# Patient Record
Sex: MALE | Race: BLACK OR AFRICAN AMERICAN | NOT HISPANIC OR LATINO | URBAN - METROPOLITAN AREA
[De-identification: names, ages, dates, MRNs, and addresses within clinical notes are randomized per-mention and may not be internally consistent; named-entity substitution may affect disease eponyms.]

---

## 2023-02-18 ENCOUNTER — INPATIENT (INPATIENT)
Facility: HOSPITAL | Age: 33
LOS: 1 days | Discharge: LEFT BEFORE DISCHARGE | DRG: 603 | End: 2023-02-20
Attending: STUDENT IN AN ORGANIZED HEALTH CARE EDUCATION/TRAINING PROGRAM | Admitting: STUDENT IN AN ORGANIZED HEALTH CARE EDUCATION/TRAINING PROGRAM
Payer: MEDICAID

## 2023-02-18 VITALS
HEIGHT: 70 IN | WEIGHT: 169.98 LBS | OXYGEN SATURATION: 98 % | SYSTOLIC BLOOD PRESSURE: 139 MMHG | HEART RATE: 110 BPM | DIASTOLIC BLOOD PRESSURE: 93 MMHG | RESPIRATION RATE: 16 BRPM | TEMPERATURE: 98 F

## 2023-02-18 DIAGNOSIS — L03.90 CELLULITIS, UNSPECIFIED: ICD-10-CM

## 2023-02-18 LAB
ACETONE SERPL-MCNC: NEGATIVE — SIGNIFICANT CHANGE UP
ALBUMIN SERPL ELPH-MCNC: 3.1 G/DL — LOW (ref 3.5–5)
ALP SERPL-CCNC: 164 U/L — HIGH (ref 40–120)
ALT FLD-CCNC: 26 U/L DA — SIGNIFICANT CHANGE UP (ref 10–60)
ANION GAP SERPL CALC-SCNC: 5 MMOL/L — SIGNIFICANT CHANGE UP (ref 5–17)
AST SERPL-CCNC: 20 U/L — SIGNIFICANT CHANGE UP (ref 10–40)
BASE EXCESS BLDV CALC-SCNC: 6.3 MMOL/L — SIGNIFICANT CHANGE UP
BASOPHILS # BLD AUTO: 0.07 K/UL — SIGNIFICANT CHANGE UP (ref 0–0.2)
BASOPHILS NFR BLD AUTO: 0.6 % — SIGNIFICANT CHANGE UP (ref 0–2)
BILIRUB SERPL-MCNC: 0.2 MG/DL — SIGNIFICANT CHANGE UP (ref 0.2–1.2)
BUN SERPL-MCNC: 18 MG/DL — SIGNIFICANT CHANGE UP (ref 7–18)
CALCIUM SERPL-MCNC: 9.5 MG/DL — SIGNIFICANT CHANGE UP (ref 8.4–10.5)
CHLORIDE SERPL-SCNC: 95 MMOL/L — LOW (ref 96–108)
CO2 SERPL-SCNC: 28 MMOL/L — SIGNIFICANT CHANGE UP (ref 22–31)
CREAT SERPL-MCNC: 1.09 MG/DL — SIGNIFICANT CHANGE UP (ref 0.5–1.3)
EGFR: 94 ML/MIN/1.73M2 — SIGNIFICANT CHANGE UP
EOSINOPHIL # BLD AUTO: 0.43 K/UL — SIGNIFICANT CHANGE UP (ref 0–0.5)
EOSINOPHIL NFR BLD AUTO: 3.5 % — SIGNIFICANT CHANGE UP (ref 0–6)
FLUAV AG NPH QL: SIGNIFICANT CHANGE UP
FLUBV AG NPH QL: SIGNIFICANT CHANGE UP
GLUCOSE SERPL-MCNC: 667 MG/DL — CRITICAL HIGH (ref 70–99)
HCO3 BLDV-SCNC: 32 MMOL/L — HIGH (ref 22–29)
HCT VFR BLD CALC: 44 % — SIGNIFICANT CHANGE UP (ref 39–50)
HGB BLD-MCNC: 14.6 G/DL — SIGNIFICANT CHANGE UP (ref 13–17)
HIV 1 & 2 AB SERPL IA.RAPID: SIGNIFICANT CHANGE UP
HOROWITZ INDEX BLDV+IHG-RTO: 21 — SIGNIFICANT CHANGE UP
IMM GRANULOCYTES NFR BLD AUTO: 0.6 % — SIGNIFICANT CHANGE UP (ref 0–0.9)
LACTATE SERPL-SCNC: 1.1 MMOL/L — SIGNIFICANT CHANGE UP (ref 0.7–2)
LYMPHOCYTES # BLD AUTO: 25.6 % — SIGNIFICANT CHANGE UP (ref 13–44)
LYMPHOCYTES # BLD AUTO: 3.18 K/UL — SIGNIFICANT CHANGE UP (ref 1–3.3)
MAGNESIUM SERPL-MCNC: 2.1 MG/DL — SIGNIFICANT CHANGE UP (ref 1.6–2.6)
MCHC RBC-ENTMCNC: 25.4 PG — LOW (ref 27–34)
MCHC RBC-ENTMCNC: 33.2 GM/DL — SIGNIFICANT CHANGE UP (ref 32–36)
MCV RBC AUTO: 76.5 FL — LOW (ref 80–100)
MONOCYTES # BLD AUTO: 0.71 K/UL — SIGNIFICANT CHANGE UP (ref 0–0.9)
MONOCYTES NFR BLD AUTO: 5.7 % — SIGNIFICANT CHANGE UP (ref 2–14)
NEUTROPHILS # BLD AUTO: 7.95 K/UL — HIGH (ref 1.8–7.4)
NEUTROPHILS NFR BLD AUTO: 64 % — SIGNIFICANT CHANGE UP (ref 43–77)
NRBC # BLD: 0 /100 WBCS — SIGNIFICANT CHANGE UP (ref 0–0)
PCO2 BLDV: 51 MMHG — SIGNIFICANT CHANGE UP (ref 42–55)
PH BLDV: 7.41 — SIGNIFICANT CHANGE UP (ref 7.32–7.43)
PLATELET # BLD AUTO: 318 K/UL — SIGNIFICANT CHANGE UP (ref 150–400)
PO2 BLDV: 47 MMHG — SIGNIFICANT CHANGE UP
POTASSIUM SERPL-MCNC: 4.4 MMOL/L — SIGNIFICANT CHANGE UP (ref 3.5–5.3)
POTASSIUM SERPL-SCNC: 4.4 MMOL/L — SIGNIFICANT CHANGE UP (ref 3.5–5.3)
PROT SERPL-MCNC: 8.3 G/DL — SIGNIFICANT CHANGE UP (ref 6–8.3)
RBC # BLD: 5.75 M/UL — SIGNIFICANT CHANGE UP (ref 4.2–5.8)
RBC # FLD: 12.4 % — SIGNIFICANT CHANGE UP (ref 10.3–14.5)
SAO2 % BLDV: 77.6 % — SIGNIFICANT CHANGE UP
SARS-COV-2 RNA SPEC QL NAA+PROBE: SIGNIFICANT CHANGE UP
SODIUM SERPL-SCNC: 128 MMOL/L — LOW (ref 135–145)
WBC # BLD: 12.41 K/UL — HIGH (ref 3.8–10.5)
WBC # FLD AUTO: 12.41 K/UL — HIGH (ref 3.8–10.5)

## 2023-02-18 PROCEDURE — 99285 EMERGENCY DEPT VISIT HI MDM: CPT

## 2023-02-18 PROCEDURE — 73630 X-RAY EXAM OF FOOT: CPT | Mod: 26,RT

## 2023-02-18 RX ORDER — SODIUM CHLORIDE 9 MG/ML
1000 INJECTION INTRAMUSCULAR; INTRAVENOUS; SUBCUTANEOUS ONCE
Refills: 0 | Status: COMPLETED | OUTPATIENT
Start: 2023-02-18 | End: 2023-02-18

## 2023-02-18 RX ORDER — KETOROLAC TROMETHAMINE 30 MG/ML
30 SYRINGE (ML) INJECTION ONCE
Refills: 0 | Status: DISCONTINUED | OUTPATIENT
Start: 2023-02-18 | End: 2023-02-18

## 2023-02-18 RX ADMIN — Medication 30 MILLIGRAM(S): at 21:36

## 2023-02-18 RX ADMIN — SODIUM CHLORIDE 1000 MILLILITER(S): 9 INJECTION INTRAMUSCULAR; INTRAVENOUS; SUBCUTANEOUS at 21:45

## 2023-02-18 RX ADMIN — Medication 100 MILLIGRAM(S): at 23:25

## 2023-02-18 RX ADMIN — SODIUM CHLORIDE 1000 MILLILITER(S): 9 INJECTION INTRAMUSCULAR; INTRAVENOUS; SUBCUTANEOUS at 21:36

## 2023-02-18 RX ADMIN — Medication 30 MILLIGRAM(S): at 22:06
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
## 2023-02-18 NOTE — ED PROVIDER NOTE - OBJECTIVE STATEMENT
32M, no significant pmh, presenting with pain and swelling of right toes. patient reports one week ago he slept in his car because he couldn't finding parking. he took off his shoes and placed this on the heater. the next morning he noticed his foot was swollen and painful. had been using neosporin and the swelling improved, but today the pain became worse. no fever, chest pain, trouble breathing.

## 2023-02-18 NOTE — ED PROVIDER NOTE - PHYSICAL EXAMINATION
General: well appearing male, no acute distress   HEENT: normocephalic, atraumatic   Respiratory: normal work of breathing  Cardiac: 2+ right DP  Abdomen: soft, non-tender, no guarding or rebound   MSK: right foot discoloration (dusky), warmth, tenderness to palpation, ulcers to first three digits   Skin: warm, dry   Neuro: A&Ox3  Psych: appropriate affect
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
## 2023-02-18 NOTE — ED ADULT NURSE NOTE - OBJECTIVE STATEMENT
Pt presented to the ED with c/o c/o pain and infected right 1st, 2nd and 3rd toe. Right digits noted to be black, open area to the 1st, 2nd and 3rd digits. Positive pulses noted, skin warm to touch, minimal movement to toes.

## 2023-02-18 NOTE — ED ADULT NURSE NOTE - NSIMPLEMENTINTERV_GEN_ALL_ED
Implemented All Universal Safety Interventions:  [place] to call system. Call bell, personal items and telephone within reach. Instruct patient to call for assistance. Room bathroom lighting operational. Non-slip footwear when patient is off stretcher. Physically safe environment: no spills, clutter or unnecessary equipment. Stretcher in lowest position, wheels locked, appropriate side rails in place.

## 2023-02-19 DIAGNOSIS — R79.89 OTHER SPECIFIED ABNORMAL FINDINGS OF BLOOD CHEMISTRY: ICD-10-CM

## 2023-02-19 DIAGNOSIS — E11.65 TYPE 2 DIABETES MELLITUS WITH HYPERGLYCEMIA: ICD-10-CM

## 2023-02-19 DIAGNOSIS — F17.200 NICOTINE DEPENDENCE, UNSPECIFIED, UNCOMPLICATED: ICD-10-CM

## 2023-02-19 DIAGNOSIS — E78.5 HYPERLIPIDEMIA, UNSPECIFIED: ICD-10-CM

## 2023-02-19 DIAGNOSIS — R46.89 OTHER SYMPTOMS AND SIGNS INVOLVING APPEARANCE AND BEHAVIOR: ICD-10-CM

## 2023-02-19 DIAGNOSIS — L03.115 CELLULITIS OF RIGHT LOWER LIMB: ICD-10-CM

## 2023-02-19 DIAGNOSIS — Z29.9 ENCOUNTER FOR PROPHYLACTIC MEASURES, UNSPECIFIED: ICD-10-CM

## 2023-02-19 DIAGNOSIS — L03.031 CELLULITIS OF RIGHT TOE: ICD-10-CM

## 2023-02-19 LAB
A1C WITH ESTIMATED AVERAGE GLUCOSE RESULT: 15.5 % — HIGH (ref 4–5.6)
ACETONE SERPL-MCNC: ABNORMAL
ALBUMIN SERPL ELPH-MCNC: 2.6 G/DL — LOW (ref 3.5–5)
ALP SERPL-CCNC: 119 U/L — SIGNIFICANT CHANGE UP (ref 40–120)
ALT FLD-CCNC: 23 U/L DA — SIGNIFICANT CHANGE UP (ref 10–60)
ANION GAP SERPL CALC-SCNC: 6 MMOL/L — SIGNIFICANT CHANGE UP (ref 5–17)
APPEARANCE UR: CLEAR — SIGNIFICANT CHANGE UP
AST SERPL-CCNC: 13 U/L — SIGNIFICANT CHANGE UP (ref 10–40)
BACTERIA # UR AUTO: ABNORMAL /HPF
BASOPHILS # BLD AUTO: 0.09 K/UL — SIGNIFICANT CHANGE UP (ref 0–0.2)
BASOPHILS NFR BLD AUTO: 0.9 % — SIGNIFICANT CHANGE UP (ref 0–2)
BILIRUB SERPL-MCNC: 0.3 MG/DL — SIGNIFICANT CHANGE UP (ref 0.2–1.2)
BILIRUB UR-MCNC: NEGATIVE — SIGNIFICANT CHANGE UP
BUN SERPL-MCNC: 15 MG/DL — SIGNIFICANT CHANGE UP (ref 7–18)
CALCIUM SERPL-MCNC: 8.6 MG/DL — SIGNIFICANT CHANGE UP (ref 8.4–10.5)
CHLORIDE SERPL-SCNC: 104 MMOL/L — SIGNIFICANT CHANGE UP (ref 96–108)
CHOLEST SERPL-MCNC: 239 MG/DL — HIGH
CO2 SERPL-SCNC: 28 MMOL/L — SIGNIFICANT CHANGE UP (ref 22–31)
COLOR SPEC: YELLOW — SIGNIFICANT CHANGE UP
CREAT SERPL-MCNC: 0.88 MG/DL — SIGNIFICANT CHANGE UP (ref 0.5–1.3)
CRP SERPL-MCNC: 6 MG/L — HIGH
DIFF PNL FLD: ABNORMAL
EGFR: 119 ML/MIN/1.73M2 — SIGNIFICANT CHANGE UP
EOSINOPHIL # BLD AUTO: 0.5 K/UL — SIGNIFICANT CHANGE UP (ref 0–0.5)
EOSINOPHIL NFR BLD AUTO: 4.8 % — SIGNIFICANT CHANGE UP (ref 0–6)
ESTIMATED AVERAGE GLUCOSE: 398 MG/DL — HIGH (ref 68–114)
GLUCOSE BLDC GLUCOMTR-MCNC: 168 MG/DL — HIGH (ref 70–99)
GLUCOSE BLDC GLUCOMTR-MCNC: 239 MG/DL — HIGH (ref 70–99)
GLUCOSE BLDC GLUCOMTR-MCNC: 278 MG/DL — HIGH (ref 70–99)
GLUCOSE BLDC GLUCOMTR-MCNC: 294 MG/DL — HIGH (ref 70–99)
GLUCOSE BLDC GLUCOMTR-MCNC: 359 MG/DL — HIGH (ref 70–99)
GLUCOSE BLDC GLUCOMTR-MCNC: 399 MG/DL — HIGH (ref 70–99)
GLUCOSE SERPL-MCNC: 396 MG/DL — HIGH (ref 70–99)
GLUCOSE UR QL: 1000 MG/DL
HCT VFR BLD CALC: 42 % — SIGNIFICANT CHANGE UP (ref 39–50)
HDLC SERPL-MCNC: 46 MG/DL — SIGNIFICANT CHANGE UP
HGB BLD-MCNC: 13.5 G/DL — SIGNIFICANT CHANGE UP (ref 13–17)
IMM GRANULOCYTES NFR BLD AUTO: 0.3 % — SIGNIFICANT CHANGE UP (ref 0–0.9)
KETONES UR-MCNC: NEGATIVE — SIGNIFICANT CHANGE UP
LEUKOCYTE ESTERASE UR-ACNC: NEGATIVE — SIGNIFICANT CHANGE UP
LIPID PNL WITH DIRECT LDL SERPL: 124 MG/DL — HIGH
LYMPHOCYTES # BLD AUTO: 3.95 K/UL — HIGH (ref 1–3.3)
LYMPHOCYTES # BLD AUTO: 37.8 % — SIGNIFICANT CHANGE UP (ref 13–44)
MAGNESIUM SERPL-MCNC: 2 MG/DL — SIGNIFICANT CHANGE UP (ref 1.6–2.6)
MCHC RBC-ENTMCNC: 25 PG — LOW (ref 27–34)
MCHC RBC-ENTMCNC: 32.1 GM/DL — SIGNIFICANT CHANGE UP (ref 32–36)
MCV RBC AUTO: 77.8 FL — LOW (ref 80–100)
MONOCYTES # BLD AUTO: 0.63 K/UL — SIGNIFICANT CHANGE UP (ref 0–0.9)
MONOCYTES NFR BLD AUTO: 6 % — SIGNIFICANT CHANGE UP (ref 2–14)
NEUTROPHILS # BLD AUTO: 5.24 K/UL — SIGNIFICANT CHANGE UP (ref 1.8–7.4)
NEUTROPHILS NFR BLD AUTO: 50.2 % — SIGNIFICANT CHANGE UP (ref 43–77)
NITRITE UR-MCNC: NEGATIVE — SIGNIFICANT CHANGE UP
NON HDL CHOLESTEROL: 193 MG/DL — HIGH
NRBC # BLD: 0 /100 WBCS — SIGNIFICANT CHANGE UP (ref 0–0)
PH UR: 6 — SIGNIFICANT CHANGE UP (ref 5–8)
PHOSPHATE SERPL-MCNC: 3.4 MG/DL — SIGNIFICANT CHANGE UP (ref 2.5–4.5)
PLATELET # BLD AUTO: 290 K/UL — SIGNIFICANT CHANGE UP (ref 150–400)
POTASSIUM SERPL-MCNC: 4.1 MMOL/L — SIGNIFICANT CHANGE UP (ref 3.5–5.3)
POTASSIUM SERPL-SCNC: 4.1 MMOL/L — SIGNIFICANT CHANGE UP (ref 3.5–5.3)
PROT SERPL-MCNC: 7.2 G/DL — SIGNIFICANT CHANGE UP (ref 6–8.3)
PROT UR-MCNC: 30 MG/DL
RBC # BLD: 5.4 M/UL — SIGNIFICANT CHANGE UP (ref 4.2–5.8)
RBC # FLD: 12.5 % — SIGNIFICANT CHANGE UP (ref 10.3–14.5)
RBC CASTS # UR COMP ASSIST: SIGNIFICANT CHANGE UP /HPF (ref 0–2)
SODIUM SERPL-SCNC: 138 MMOL/L — SIGNIFICANT CHANGE UP (ref 135–145)
SP GR SPEC: 1.01 — SIGNIFICANT CHANGE UP (ref 1.01–1.02)
TRIGL SERPL-MCNC: 347 MG/DL — HIGH
UROBILINOGEN FLD QL: NEGATIVE — SIGNIFICANT CHANGE UP
WBC # BLD: 10.44 K/UL — SIGNIFICANT CHANGE UP (ref 3.8–10.5)
WBC # FLD AUTO: 10.44 K/UL — SIGNIFICANT CHANGE UP (ref 3.8–10.5)
WBC UR QL: SIGNIFICANT CHANGE UP /HPF (ref 0–5)

## 2023-02-19 PROCEDURE — 93971 EXTREMITY STUDY: CPT | Mod: 26,RT

## 2023-02-19 PROCEDURE — 99223 1ST HOSP IP/OBS HIGH 75: CPT

## 2023-02-19 PROCEDURE — 93923 UPR/LXTR ART STDY 3+ LVLS: CPT | Mod: 26

## 2023-02-19 RX ORDER — SODIUM CHLORIDE 9 MG/ML
1000 INJECTION, SOLUTION INTRAVENOUS
Refills: 0 | Status: DISCONTINUED | OUTPATIENT
Start: 2023-02-19 | End: 2023-02-19

## 2023-02-19 RX ORDER — INFLUENZA VIRUS VACCINE 15; 15; 15; 15 UG/.5ML; UG/.5ML; UG/.5ML; UG/.5ML
0.5 SUSPENSION INTRAMUSCULAR ONCE
Refills: 0 | Status: DISCONTINUED | OUTPATIENT
Start: 2023-02-19 | End: 2023-02-20

## 2023-02-19 RX ORDER — ONDANSETRON 8 MG/1
4 TABLET, FILM COATED ORAL EVERY 8 HOURS
Refills: 0 | Status: DISCONTINUED | OUTPATIENT
Start: 2023-02-19 | End: 2023-02-20

## 2023-02-19 RX ORDER — LANOLIN ALCOHOL/MO/W.PET/CERES
3 CREAM (GRAM) TOPICAL AT BEDTIME
Refills: 0 | Status: DISCONTINUED | OUTPATIENT
Start: 2023-02-19 | End: 2023-02-20

## 2023-02-19 RX ORDER — ACETAMINOPHEN 500 MG
650 TABLET ORAL EVERY 6 HOURS
Refills: 0 | Status: DISCONTINUED | OUTPATIENT
Start: 2023-02-19 | End: 2023-02-20

## 2023-02-19 RX ORDER — DEXTROSE 50 % IN WATER 50 %
15 SYRINGE (ML) INTRAVENOUS ONCE
Refills: 0 | Status: DISCONTINUED | OUTPATIENT
Start: 2023-02-19 | End: 2023-02-19

## 2023-02-19 RX ORDER — ENOXAPARIN SODIUM 100 MG/ML
40 INJECTION SUBCUTANEOUS EVERY 24 HOURS
Refills: 0 | Status: DISCONTINUED | OUTPATIENT
Start: 2023-02-19 | End: 2023-02-20

## 2023-02-19 RX ORDER — ATORVASTATIN CALCIUM 80 MG/1
40 TABLET, FILM COATED ORAL AT BEDTIME
Refills: 0 | Status: DISCONTINUED | OUTPATIENT
Start: 2023-02-19 | End: 2023-02-20

## 2023-02-19 RX ORDER — INSULIN LISPRO 100/ML
VIAL (ML) SUBCUTANEOUS AT BEDTIME
Refills: 0 | Status: DISCONTINUED | OUTPATIENT
Start: 2023-02-19 | End: 2023-02-19

## 2023-02-19 RX ORDER — INSULIN GLARGINE 100 [IU]/ML
10 INJECTION, SOLUTION SUBCUTANEOUS AT BEDTIME
Refills: 0 | Status: DISCONTINUED | OUTPATIENT
Start: 2023-02-19 | End: 2023-02-20

## 2023-02-19 RX ORDER — INSULIN LISPRO 100/ML
VIAL (ML) SUBCUTANEOUS
Refills: 0 | Status: DISCONTINUED | OUTPATIENT
Start: 2023-02-19 | End: 2023-02-19

## 2023-02-19 RX ORDER — INSULIN LISPRO 100/ML
10 VIAL (ML) SUBCUTANEOUS ONCE
Refills: 0 | Status: COMPLETED | OUTPATIENT
Start: 2023-02-19 | End: 2023-02-19

## 2023-02-19 RX ORDER — DEXTROSE 50 % IN WATER 50 %
25 SYRINGE (ML) INTRAVENOUS ONCE
Refills: 0 | Status: DISCONTINUED | OUTPATIENT
Start: 2023-02-19 | End: 2023-02-19

## 2023-02-19 RX ORDER — GLUCAGON INJECTION, SOLUTION 0.5 MG/.1ML
1 INJECTION, SOLUTION SUBCUTANEOUS ONCE
Refills: 0 | Status: DISCONTINUED | OUTPATIENT
Start: 2023-02-19 | End: 2023-02-19

## 2023-02-19 RX ORDER — SODIUM CHLORIDE 9 MG/ML
1000 INJECTION, SOLUTION INTRAVENOUS
Refills: 0 | Status: DISCONTINUED | OUTPATIENT
Start: 2023-02-19 | End: 2023-02-20

## 2023-02-19 RX ORDER — INSULIN LISPRO 100/ML
VIAL (ML) SUBCUTANEOUS
Refills: 0 | Status: DISCONTINUED | OUTPATIENT
Start: 2023-02-19 | End: 2023-02-20

## 2023-02-19 RX ORDER — DEXTROSE 50 % IN WATER 50 %
12.5 SYRINGE (ML) INTRAVENOUS ONCE
Refills: 0 | Status: DISCONTINUED | OUTPATIENT
Start: 2023-02-19 | End: 2023-02-19

## 2023-02-19 RX ADMIN — ENOXAPARIN SODIUM 40 MILLIGRAM(S): 100 INJECTION SUBCUTANEOUS at 12:50

## 2023-02-19 RX ADMIN — INSULIN GLARGINE 10 UNIT(S): 100 INJECTION, SOLUTION SUBCUTANEOUS at 21:51

## 2023-02-19 RX ADMIN — Medication 100 MILLIGRAM(S): at 13:52

## 2023-02-19 RX ADMIN — SODIUM CHLORIDE 100 MILLILITER(S): 9 INJECTION, SOLUTION INTRAVENOUS at 07:15

## 2023-02-19 RX ADMIN — ATORVASTATIN CALCIUM 40 MILLIGRAM(S): 80 TABLET, FILM COATED ORAL at 21:39

## 2023-02-19 RX ADMIN — Medication 100 MILLIGRAM(S): at 21:38

## 2023-02-19 RX ADMIN — SODIUM CHLORIDE 100 MILLILITER(S): 9 INJECTION, SOLUTION INTRAVENOUS at 04:31

## 2023-02-19 RX ADMIN — Medication 2: at 12:49

## 2023-02-19 RX ADMIN — Medication 4: at 16:54

## 2023-02-19 RX ADMIN — Medication 10 UNIT(S): at 05:50

## 2023-02-19 RX ADMIN — Medication 3 MILLIGRAM(S): at 21:39

## 2023-02-19 NOTE — H&P ADULT - PROBLEM SELECTOR PLAN 4
Lovenox smokes 5-6 cigarettes/ day for the past 8ys  denied nicotine patch  educated on smoking cessation previous DM dx, loss to follow up  dose not have PCP and not on any meds   Will need PCP established upon DC  Will need Endocrinology f/u for Uncontrolled DM upon DC  f/u A1C and Lipid to risk stratify
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
## 2023-02-19 NOTE — H&P ADULT - NSHPPHYSICALEXAM_GEN_ALL_CORE
Vital Signs Last 24 Hrs  T(C): 36.4 (18 Feb 2023 23:49), Max: 36.8 (18 Feb 2023 19:44)  T(F): 97.6 (18 Feb 2023 23:49), Max: 98.3 (18 Feb 2023 19:44)  HR: 85 (18 Feb 2023 23:49) (85 - 110)  BP: 121/76 (18 Feb 2023 23:49) (121/76 - 139/93)  BP(mean): --  RR: 17 (18 Feb 2023 23:49) (16 - 17)  SpO2: 100% (18 Feb 2023 23:49) (98% - 100%)    Parameters below as of 18 Feb 2023 23:49  Patient On (Oxygen Delivery Method): room air    GENERAL: NAD, lying in bed comfortably  HEAD:  Atraumatic, Normocephalic  EYES: EOMI, PERRLA, conjunctiva and sclera clear  ENT: Moist mucous membranes  NECK: Supple, No JVD  CHEST/LUNG: Clear to auscultation bilaterally; No rales, rhonchi, wheezing, or rubs. Unlabored respirations  HEART: Regular rate and rhythm; No murmurs, rubs, or gallops  ABDOMEN: Bowel sounds present; Soft, Nontender, Nondistended.  EXTREMITIES:  2+ Peripheral Pulses, brisk capillary refill. No clubbing, cyanosis, or edema   NERVOUS SYSTEM:  Alert & Oriented X3, speech clear. No deficits   MSK: FROM all 4 extremities, full and equal strength  SKIN: No rashes or lesions

## 2023-02-19 NOTE — H&P ADULT - PROBLEM SELECTOR PLAN 1
p/w Serum glucose 667 and Serum OSm 299, not in DKA , Acidotic, and negative Acetone  - but cannot r/o HHS  h/o DM dx 4 years ago, untreated only managed with weight loss, loss to f/u  s/p 2L in ED, with improvement FS now 445  f/u A1c  c/w sliding scale  Carbohydrate Consistent Diet  Adjust insulin as indicated  FS ACHS  f/u serum Ketone  will get UA to assess for ketonuria  Endocrinology consulted: Dr. Ko  Nutrition consulted p/w Serum glucose 667 and Serum OSm 299, not in DKA , not acidotic, and negative Acetone   h/o DM dx 4 years ago, untreated only managed with weight loss, loss to f/u  s/p 2L in ED, with improvement FS now 445  f/u A1c  c/w sliding scale  Carbohydrate Consistent Diet  Adjust insulin as indicated  FS ACHS  will get UA to assess for ketonuria  Endocrinology consulted: Dr. Ko  Nutrition consulted p/w Serum glucose 667 and Serum OSm 299, not in DKA , not acidotic, and negative Acetone   h/o DM dx 4 years ago, untreated only managed with weight loss, loss to f/u  s/p 2L in ED, with improvement FS now 445  f/u A1c  c/w moderate sliding scale  Carbohydrate Consistent Diet  Adjust insulin as indicated  FS ACHS  will get UA to assess for ketonuria  Endocrinology consulted: Dr. Ko  Nutrition consulted
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
## 2023-02-19 NOTE — H&P ADULT - PROBLEM SELECTOR PLAN 3
previous DM dx, loss to follow up  dose not have PCP and not on any meds   Will need PCP established upon DC  Will need Endocrinology f/u for Uncontrolled DM upon DC  f/u A1C and Lipid to risk stratify h/o HLD not taking meds, diet controlled  Total Cholesterol 239    HDL 46    ASCVD 6% CVD risk in next 10 years   will start Atorvastatin 40mg for risk factors and already failed with lifestyle modifications

## 2023-02-19 NOTE — H&P ADULT - PROBLEM SELECTOR PLAN 5
Lovenox smokes 5-6 cigarettes/ day for the past 8ys  denied nicotine patch  educated on smoking cessation

## 2023-02-19 NOTE — H&P ADULT - PROBLEM SELECTOR PLAN 2
p/w right foot pain, unable to bear weight x1wk  (+) Right distal hallux tuft eschar w/out fluctuance; Right dorsal 2nd and 3rd digit wounds w/ fibro-necrotic changes w/out drainage or fluctuance. No LE edema  XR RT foot: no evidence of OM; f/u official read   s/p Clindamycin and Ketoralac in the ED   c/w Cefazolin   f/u  US duplex to RLE due to h/o smoking and calf tenderness, as per Podiatry  Podiatry consulted  -Ordered CLAUDETTE/PVR  -Ordered A1c  -Ordered ESR/CRP p/w right foot pain, unable to bear weight x1wk  (+) Right distal hallux tuft eschar w/out fluctuance; Right dorsal 2nd and 3rd digit wounds w/ fibro-necrotic changes w/out drainage or fluctuance. No LE edema  XR RT foot: no evidence of OM; f/u official read   s/p Clindamycin and Ketoralac in the ED   c/w Clindamycin  f/u  US duplex to RLE due to h/o smoking and calf tenderness, as per Podiatry  Podiatry consulted  -Ordered LCAUDETTE/PVR  -Ordered A1c  -Ordered ESR/CRP
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
## 2023-02-19 NOTE — PATIENT PROFILE ADULT - FUNCTIONAL ASSESSMENT - BASIC MOBILITY 3.
Call Center TCM Note    Flowsheet Row Responses   [facility] patient discharged from? Non-BH   Does the patient have one of the following disease processes/diagnoses(primary or secondary)? Other   TCM attempt successful? No   Unsuccessful attempts Attempt 3          Monique Monge RN    9/30/2022, 11:05 EDT       4 = No assist / stand by assistance

## 2023-02-19 NOTE — PATIENT PROFILE ADULT - FALL HARM RISK - HARM RISK INTERVENTIONS
Assistance with ambulation/Assistance OOB with selected safe patient handling equipment/Communicate Risk of Fall with Harm to all staff/Discuss with provider need for PT consult/Monitor gait and stability/Reinforce activity limits and safety measures with patient and family/Tailored Fall Risk Interventions/Use of alarms - bed, chair and/or voice tab/Visual Cue: Yellow wristband and red socks/Bed in lowest position, wheels locked, appropriate side rails in place/Call bell, personal items and telephone in reach/Instruct patient to call for assistance before getting out of bed or chair/Non-slip footwear when patient is out of bed/[place] to call system/Physically safe environment - no spills, clutter or unnecessary equipment/Purposeful Proactive Rounding/Room/bathroom lighting operational, light cord in reach
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
## 2023-02-20 ENCOUNTER — INPATIENT (INPATIENT)
Facility: HOSPITAL | Age: 33
LOS: 1 days | Discharge: HOME | DRG: 638 | End: 2023-02-22
Attending: STUDENT IN AN ORGANIZED HEALTH CARE EDUCATION/TRAINING PROGRAM | Admitting: STUDENT IN AN ORGANIZED HEALTH CARE EDUCATION/TRAINING PROGRAM
Payer: MEDICAID

## 2023-02-20 VITALS
HEIGHT: 70 IN | DIASTOLIC BLOOD PRESSURE: 77 MMHG | TEMPERATURE: 98 F | WEIGHT: 154.32 LBS | SYSTOLIC BLOOD PRESSURE: 118 MMHG | HEART RATE: 89 BPM | OXYGEN SATURATION: 97 % | RESPIRATION RATE: 18 BRPM

## 2023-02-20 LAB
A1C WITH ESTIMATED AVERAGE GLUCOSE RESULT: 15.4 % — HIGH (ref 4–5.6)
ALBUMIN SERPL ELPH-MCNC: 2.6 G/DL — LOW (ref 3.5–5)
ALP SERPL-CCNC: 104 U/L — SIGNIFICANT CHANGE UP (ref 40–120)
ALT FLD-CCNC: 28 U/L DA — SIGNIFICANT CHANGE UP (ref 10–60)
AMPHET UR-MCNC: NEGATIVE — SIGNIFICANT CHANGE UP
ANION GAP SERPL CALC-SCNC: 7 MMOL/L — SIGNIFICANT CHANGE UP (ref 5–17)
AST SERPL-CCNC: 16 U/L — SIGNIFICANT CHANGE UP (ref 10–40)
BARBITURATES UR SCN-MCNC: NEGATIVE — SIGNIFICANT CHANGE UP
BASOPHILS # BLD AUTO: 0.09 K/UL — SIGNIFICANT CHANGE UP (ref 0–0.2)
BASOPHILS NFR BLD AUTO: 0.8 % — SIGNIFICANT CHANGE UP (ref 0–2)
BENZODIAZ UR-MCNC: NEGATIVE — SIGNIFICANT CHANGE UP
BILIRUB SERPL-MCNC: 0.2 MG/DL — SIGNIFICANT CHANGE UP (ref 0.2–1.2)
BUN SERPL-MCNC: 11 MG/DL — SIGNIFICANT CHANGE UP (ref 7–18)
CALCIUM SERPL-MCNC: 8.9 MG/DL — SIGNIFICANT CHANGE UP (ref 8.4–10.5)
CHLORIDE SERPL-SCNC: 102 MMOL/L — SIGNIFICANT CHANGE UP (ref 96–108)
CO2 SERPL-SCNC: 28 MMOL/L — SIGNIFICANT CHANGE UP (ref 22–31)
COCAINE METAB.OTHER UR-MCNC: NEGATIVE — SIGNIFICANT CHANGE UP
CREAT SERPL-MCNC: 0.89 MG/DL — SIGNIFICANT CHANGE UP (ref 0.5–1.3)
EGFR: 118 ML/MIN/1.73M2 — SIGNIFICANT CHANGE UP
EOSINOPHIL # BLD AUTO: 0.49 K/UL — SIGNIFICANT CHANGE UP (ref 0–0.5)
EOSINOPHIL NFR BLD AUTO: 4.4 % — SIGNIFICANT CHANGE UP (ref 0–6)
ESTIMATED AVERAGE GLUCOSE: 395 MG/DL — HIGH (ref 68–114)
GLUCOSE BLDC GLUCOMTR-MCNC: 252 MG/DL — HIGH (ref 70–99)
GLUCOSE BLDC GLUCOMTR-MCNC: 287 MG/DL — HIGH (ref 70–99)
GLUCOSE BLDC GLUCOMTR-MCNC: 293 MG/DL — HIGH (ref 70–99)
GLUCOSE SERPL-MCNC: 326 MG/DL — HIGH (ref 70–99)
HCT VFR BLD CALC: 42.6 % — SIGNIFICANT CHANGE UP (ref 39–50)
HGB BLD-MCNC: 13.6 G/DL — SIGNIFICANT CHANGE UP (ref 13–17)
IMM GRANULOCYTES NFR BLD AUTO: 0.5 % — SIGNIFICANT CHANGE UP (ref 0–0.9)
LYMPHOCYTES # BLD AUTO: 3.46 K/UL — HIGH (ref 1–3.3)
LYMPHOCYTES # BLD AUTO: 31.3 % — SIGNIFICANT CHANGE UP (ref 13–44)
MAGNESIUM SERPL-MCNC: 1.8 MG/DL — SIGNIFICANT CHANGE UP (ref 1.6–2.6)
MCHC RBC-ENTMCNC: 24.8 PG — LOW (ref 27–34)
MCHC RBC-ENTMCNC: 31.9 GM/DL — LOW (ref 32–36)
MCV RBC AUTO: 77.7 FL — LOW (ref 80–100)
METHADONE UR-MCNC: NEGATIVE — SIGNIFICANT CHANGE UP
MONOCYTES # BLD AUTO: 0.66 K/UL — SIGNIFICANT CHANGE UP (ref 0–0.9)
MONOCYTES NFR BLD AUTO: 6 % — SIGNIFICANT CHANGE UP (ref 2–14)
NEUTROPHILS # BLD AUTO: 6.31 K/UL — SIGNIFICANT CHANGE UP (ref 1.8–7.4)
NEUTROPHILS NFR BLD AUTO: 57 % — SIGNIFICANT CHANGE UP (ref 43–77)
NRBC # BLD: 0 /100 WBCS — SIGNIFICANT CHANGE UP (ref 0–0)
OPIATES UR-MCNC: NEGATIVE — SIGNIFICANT CHANGE UP
PCP SPEC-MCNC: SIGNIFICANT CHANGE UP
PCP UR-MCNC: NEGATIVE — SIGNIFICANT CHANGE UP
PHOSPHATE SERPL-MCNC: 3.6 MG/DL — SIGNIFICANT CHANGE UP (ref 2.5–4.5)
PLATELET # BLD AUTO: 311 K/UL — SIGNIFICANT CHANGE UP (ref 150–400)
POTASSIUM SERPL-MCNC: 4.1 MMOL/L — SIGNIFICANT CHANGE UP (ref 3.5–5.3)
POTASSIUM SERPL-SCNC: 4.1 MMOL/L — SIGNIFICANT CHANGE UP (ref 3.5–5.3)
PROT SERPL-MCNC: 7.2 G/DL — SIGNIFICANT CHANGE UP (ref 6–8.3)
RBC # BLD: 5.48 M/UL — SIGNIFICANT CHANGE UP (ref 4.2–5.8)
RBC # FLD: 12.3 % — SIGNIFICANT CHANGE UP (ref 10.3–14.5)
SODIUM SERPL-SCNC: 137 MMOL/L — SIGNIFICANT CHANGE UP (ref 135–145)
THC UR QL: POSITIVE
WBC # BLD: 11.06 K/UL — HIGH (ref 3.8–10.5)
WBC # FLD AUTO: 11.06 K/UL — HIGH (ref 3.8–10.5)

## 2023-02-20 PROCEDURE — 80061 LIPID PANEL: CPT

## 2023-02-20 PROCEDURE — 86140 C-REACTIVE PROTEIN: CPT

## 2023-02-20 PROCEDURE — 93971 EXTREMITY STUDY: CPT

## 2023-02-20 PROCEDURE — 93923 UPR/LXTR ART STDY 3+ LVLS: CPT

## 2023-02-20 PROCEDURE — 82803 BLOOD GASES ANY COMBINATION: CPT

## 2023-02-20 PROCEDURE — 85652 RBC SED RATE AUTOMATED: CPT

## 2023-02-20 PROCEDURE — 82962 GLUCOSE BLOOD TEST: CPT

## 2023-02-20 PROCEDURE — 86703 HIV-1/HIV-2 1 RESULT ANTBDY: CPT

## 2023-02-20 PROCEDURE — 82009 KETONE BODYS QUAL: CPT

## 2023-02-20 PROCEDURE — 84100 ASSAY OF PHOSPHORUS: CPT

## 2023-02-20 PROCEDURE — 80053 COMPREHEN METABOLIC PANEL: CPT

## 2023-02-20 PROCEDURE — 99285 EMERGENCY DEPT VISIT HI MDM: CPT | Mod: 25

## 2023-02-20 PROCEDURE — 73630 X-RAY EXAM OF FOOT: CPT

## 2023-02-20 PROCEDURE — 80307 DRUG TEST PRSMV CHEM ANLYZR: CPT

## 2023-02-20 PROCEDURE — 83735 ASSAY OF MAGNESIUM: CPT

## 2023-02-20 PROCEDURE — 36415 COLL VENOUS BLD VENIPUNCTURE: CPT

## 2023-02-20 PROCEDURE — 99053 MED SERV 10PM-8AM 24 HR FAC: CPT

## 2023-02-20 PROCEDURE — 83036 HEMOGLOBIN GLYCOSYLATED A1C: CPT

## 2023-02-20 PROCEDURE — 96374 THER/PROPH/DIAG INJ IV PUSH: CPT

## 2023-02-20 PROCEDURE — 87040 BLOOD CULTURE FOR BACTERIA: CPT

## 2023-02-20 PROCEDURE — 87070 CULTURE OTHR SPECIMN AEROBIC: CPT

## 2023-02-20 PROCEDURE — 87186 SC STD MICRODIL/AGAR DIL: CPT

## 2023-02-20 PROCEDURE — 99285 EMERGENCY DEPT VISIT HI MDM: CPT

## 2023-02-20 PROCEDURE — 81001 URINALYSIS AUTO W/SCOPE: CPT

## 2023-02-20 PROCEDURE — 83605 ASSAY OF LACTIC ACID: CPT

## 2023-02-20 PROCEDURE — 87637 SARSCOV2&INF A&B&RSV AMP PRB: CPT

## 2023-02-20 PROCEDURE — 99233 SBSQ HOSP IP/OBS HIGH 50: CPT | Mod: GC

## 2023-02-20 PROCEDURE — 85025 COMPLETE CBC W/AUTO DIFF WBC: CPT

## 2023-02-20 PROCEDURE — 87077 CULTURE AEROBIC IDENTIFY: CPT

## 2023-02-20 RX ORDER — INSULIN LISPRO 100/ML
0 VIAL (ML) SUBCUTANEOUS
Qty: 0 | Refills: 0 | DISCHARGE
Start: 2023-02-20

## 2023-02-20 RX ORDER — INSULIN LISPRO 100/ML
4 VIAL (ML) SUBCUTANEOUS
Refills: 0 | Status: DISCONTINUED | OUTPATIENT
Start: 2023-02-20 | End: 2023-02-20

## 2023-02-20 RX ORDER — ATORVASTATIN CALCIUM 80 MG/1
1 TABLET, FILM COATED ORAL
Qty: 0 | Refills: 0 | DISCHARGE
Start: 2023-02-20

## 2023-02-20 RX ORDER — LIDOCAINE HCL 20 MG/ML
10 VIAL (ML) INJECTION DAILY
Refills: 0 | Status: DISCONTINUED | OUTPATIENT
Start: 2023-02-20 | End: 2023-02-20

## 2023-02-20 RX ORDER — INSULIN GLARGINE 100 [IU]/ML
15 INJECTION, SOLUTION SUBCUTANEOUS
Qty: 0 | Refills: 0 | DISCHARGE
Start: 2023-02-20

## 2023-02-20 RX ORDER — INSULIN LISPRO 100/ML
3 VIAL (ML) SUBCUTANEOUS
Qty: 0 | Refills: 0 | DISCHARGE
Start: 2023-02-20

## 2023-02-20 RX ORDER — COLLAGENASE CLOSTRIDIUM HIST. 250 UNIT/G
1 OINTMENT (GRAM) TOPICAL
Qty: 0 | Refills: 0 | DISCHARGE
Start: 2023-02-20

## 2023-02-20 RX ORDER — COLLAGENASE CLOSTRIDIUM HIST. 250 UNIT/G
1 OINTMENT (GRAM) TOPICAL DAILY
Refills: 0 | Status: DISCONTINUED | OUTPATIENT
Start: 2023-02-20 | End: 2023-02-20

## 2023-02-20 RX ORDER — SODIUM CHLORIDE 9 MG/ML
100 INJECTION, SOLUTION INTRAVENOUS
Qty: 0 | Refills: 0 | DISCHARGE
Start: 2023-02-20

## 2023-02-20 RX ORDER — INSULIN GLARGINE 100 [IU]/ML
15 INJECTION, SOLUTION SUBCUTANEOUS AT BEDTIME
Refills: 0 | Status: DISCONTINUED | OUTPATIENT
Start: 2023-02-20 | End: 2023-02-20

## 2023-02-20 RX ADMIN — ENOXAPARIN SODIUM 40 MILLIGRAM(S): 100 INJECTION SUBCUTANEOUS at 11:20

## 2023-02-20 RX ADMIN — Medication 10 MILLILITER(S): at 11:14

## 2023-02-20 RX ADMIN — Medication 6: at 08:13

## 2023-02-20 RX ADMIN — Medication 100 MILLIGRAM(S): at 05:48

## 2023-02-20 RX ADMIN — Medication 100 MILLIGRAM(S): at 13:13

## 2023-02-20 RX ADMIN — Medication 6: at 11:32

## 2023-02-20 RX ADMIN — Medication 4 UNIT(S): at 11:33

## 2023-02-20 RX ADMIN — Medication 6: at 16:53

## 2023-02-20 RX ADMIN — Medication 1 APPLICATION(S): at 11:09

## 2023-02-20 RX ADMIN — Medication 4 UNIT(S): at 16:53

## 2023-02-20 NOTE — PROGRESS NOTE ADULT - SUBJECTIVE AND OBJECTIVE BOX
PGY-1 Progress Note discussed with attending    PAGER #: [[phone]] TILL 5:00 PM  PLEASE CONTACT ON CALL TEAM:  - On Call Team (Please refer to [place]) FROM 5:00 PM - 8:30PM  - Nightfloat Team FROM 8:30 -7:30 AM      INTERVAL HPI/OVERNIGHT EVENTS: No acute events overnight.  Patient examined at bedside this AM.  Patient denies acute complaints      REVIEW OF SYSTEMS:  CONSTITUTIONAL: No fever, weight loss, or fatigue  RESPIRATORY: No cough, wheezing, chills or hemoptysis; No shortness of breath  CARDIOVASCULAR: No chest pain, palpitations, dizziness, or leg swelling  GASTROINTESTINAL: No abdominal pain. No nausea, vomiting, or hematemesis; No diarrhea or constipation. No melena or hematochezia.  GENITOURINARY: No dysuria or hematuria, urinary frequency  NEUROLOGICAL: No headaches, memory loss, loss of strength, numbness, or tremors  SKIN: No itching, burning, rashes, or lesions     Vital Signs Last 24 Hrs  T(C): 36.6 (20 Feb 2023 05:23), Max: 37.2 (19 Feb 2023 21:12)  T(F): 97.8 (20 Feb 2023 05:23), Max: 99 (19 Feb 2023 21:12)  HR: 82 (20 Feb 2023 05:23) (80 - 88)  BP: 118/83 (20 Feb 2023 05:23) (112/71 - 118/83)  BP(mean): --  RR: 16 (20 Feb 2023 05:23) (16 - 17)  SpO2: 99% (20 Feb 2023 05:23) (97% - 99%)    Parameters below as of 20 Feb 2023 05:23  Patient On (Oxygen Delivery Method): room air        PHYSICAL EXAMINATION:  GENERAL: NAD, well built  HEAD:  Atraumatic, Normocephalic  EYES:  conjunctiva and sclera clear  NECK: Supple, No JVD, Normal thyroid  CHEST/LUNG: Clear to auscultation. Clear to percussion bilaterally; No rales, rhonchi, wheezing, or rubs  HEART: Regular rate and rhythm; No murmurs, rubs, or gallops  ABDOMEN: Soft, Nontender, Nondistended; Bowel sounds present  NERVOUS SYSTEM:  Alert & Oriented X3,    EXTREMITIES: Right distal hallux tuft eschar,  Right dorsal 2nd digit wound with scant purulence on exam and overlying eschar. Right 3rd digit wound with eschar overlying, no drainage. Generalized hyperpigmentation to digits 1,2,3 on right. Weak b/l pedal pulses   SKIN: cold  in b/l lower extremities                           13.6   11.06 )-----------( 311      ( 20 Feb 2023 07:43 )             42.6     02-20    137  |  102  |  11  ----------------------------<  326<H>  4.1   |  28  |  0.89    Ca    8.9      20 Feb 2023 07:43  Phos  3.6     02-20  Mg     1.8     02-20    TPro  7.2  /  Alb  2.6<L>  /  TBili  0.2  /  DBili  x   /  AST  16  /  ALT  28  /  AlkPhos  104  02-20    LIVER FUNCTIONS - ( 20 Feb 2023 07:43 )  Alb: 2.6 g/dL / Pro: 7.2 g/dL / ALK PHOS: 104 U/L / ALT: 28 U/L DA / AST: 16 U/L / GGT: x                   CAPILLARY BLOOD GLUCOSE      RADIOLOGY & ADDITIONAL TESTS:                   PGY-1 Progress Note discussed with attending    PAGER #: [[phone]] TILL 5:00 PM  PLEASE CONTACT ON CALL TEAM:  - On Call Team (Please refer to [place]) FROM 5:00 PM - 8:30PM  - Nightfloat Team FROM 8:30 -7:30 AM      INTERVAL HPI/OVERNIGHT EVENTS: No acute events overnight.  Patient examined at bedside this AM.  Patient denies acute complaints      REVIEW OF SYSTEMS:  CONSTITUTIONAL: No fever, weight loss, or fatigue  RESPIRATORY: No cough, wheezing, chills or hemoptysis; No shortness of breath  CARDIOVASCULAR: No chest pain, palpitations, dizziness, or leg swelling  GASTROINTESTINAL: No abdominal pain. No nausea, vomiting, or hematemesis; No diarrhea or constipation. No melena or hematochezia.  GENITOURINARY: No dysuria or hematuria, urinary frequency  NEUROLOGICAL: No headaches, memory loss, loss of strength, numbness, or tremors  SKIN: no rash    Vital Signs Last 24 Hrs  T(C): 36.6 (20 Feb 2023 05:23), Max: 37.2 (19 Feb 2023 21:12)  T(F): 97.8 (20 Feb 2023 05:23), Max: 99 (19 Feb 2023 21:12)  HR: 82 (20 Feb 2023 05:23) (80 - 88)  BP: 118/83 (20 Feb 2023 05:23) (112/71 - 118/83)  BP(mean): --  RR: 16 (20 Feb 2023 05:23) (16 - 17)  SpO2: 99% (20 Feb 2023 05:23) (97% - 99%)    Parameters below as of 20 Feb 2023 05:23  Patient On (Oxygen Delivery Method): room air        PHYSICAL EXAMINATION:  GENERAL: NAD, well built  HEAD:  Atraumatic, Normocephalic  EYES:  conjunctiva and sclera clear  NECK: Supple, No JVD, Normal thyroid  CHEST/LUNG: Clear to auscultation. Clear to percussion bilaterally; No rales, rhonchi, wheezing, or rubs  HEART: Regular rate and rhythm; No murmurs, rubs, or gallops  ABDOMEN: Soft, Nontender, Nondistended; Bowel sounds present  NERVOUS SYSTEM:  Alert & Oriented X3,    EXTREMITIES: Right distal hallux tuft eschar,  Right dorsal 2nd digit wound with scant purulence on exam and overlying eschar. Right 3rd digit wound with eschar overlying, no drainage. Generalized hyperpigmentation to digits 1,2,3 on right. Weak b/l pedal pulses   SKIN: cold  in b/l lower extremities                            13.6   11.06 )-----------( 311      ( 20 Feb 2023 07:43 )             42.6     02-20    137  |  102  |  11  ----------------------------<  326<H>  4.1   |  28  |  0.89    Ca    8.9      20 Feb 2023 07:43  Phos  3.6     02-20  Mg     1.8     02-20    TPro  7.2  /  Alb  2.6<L>  /  TBili  0.2  /  DBili  x   /  AST  16  /  ALT  28  /  AlkPhos  104  02-20    LIVER FUNCTIONS - ( 20 Feb 2023 07:43 )  Alb: 2.6 g/dL / Pro: 7.2 g/dL / ALK PHOS: 104 U/L / ALT: 28 U/L DA / AST: 16 U/L / GGT: x                   CAPILLARY BLOOD GLUCOSE      RADIOLOGY & ADDITIONAL TESTS:

## 2023-02-20 NOTE — DISCHARGE NOTE PROVIDER - NSDCMRMEDTOKEN_GEN_ALL_CORE_FT
atorvastatin 40 mg oral tablet: 1 tab(s) orally once a day (at bedtime)  clindamycin 600 mg/50 mL-D5% intravenous solution: 50 milliliter(s) intravenous every 8 hours  collagenase 250 units/g topical ointment: 1 application topically once a day  insulin glargine 100 units/mL subcutaneous solution: 15 unit(s) subcutaneous once a day (at bedtime)  insulin lispro 100 units/mL injectable solution: 3  injectable 3 times a day  Lactated Ringers Injection intravenous solution: 100  intravenous every 24 hours

## 2023-02-20 NOTE — PROGRESS NOTE ADULT - PROBLEM SELECTOR PLAN 1
p/w Serum glucose 667 and Serum OSm 299, not in DKA , not acidotic, and negative Acetone   h/o DM dx 4 years ago, untreated only managed with weight loss, loss to f/u  s/p 2L in ED, with improvement FS now 445  A1c: 15.5  lantus 15 and admelog 4 TID   c/w moderate sliding scale  Carbohydrate Consistent Diet  FS ACHS  UA no ketonuria   Endocrinology consulted: Dr. Ko  Nutrition consulted p/w Serum glucose 667 and Serum OSm 299, not in DKA , not acidotic, and negative Acetone   h/o DM dx 4 years ago, untreated only managed with weight loss, loss to f/u  s/p 2L in ED, with improvement FS now 445  A1c: 15.5  Lantus 15 and Admelog 4 TID   c/w moderate sliding scale  Carbohydrate Consistent Diet  FS ACHS  UA no ketonuria   Endocrinology consulted: Dr. Ko  Nutrition consulted

## 2023-02-20 NOTE — DISCHARGE NOTE PROVIDER - HOSPITAL COURSE
32M from home, PMHx DM and HLD not taking any medications, p/w pain and swelling of right toes x1wk. He reports one week ago he slept in his car because he couldn't finding parking, slept with bare feet on the heater and woke up the next morning with foot swollen and painful. States he has been using neosporin with slight improvement in pain and swelling, but today the pain became worse. Denies trauma. Denies fevers and chills, but now states that he is unable to bear weight making it difficult for him to walk. Admits that he was told in the past about his DM 4 years ago but he attributed it to being overweight, he lost weight intentionally but never followed up and was never started on any medications, and does not follow PCP. Reports increased thirst drinking about 1 gallon/day and has increased frequency in urination, but denies dysuria, abdominal pain n/v/d, and no weight loss. Patient denies HA, SOB, chest pain, abdominal pain, or changes in BM.     Pt admitted to management of cellulitis. Podiatry and Endocrinology consulted. Started on IV clindamycin. During hospitalization, patient stated he needed to leve for 40 minutes to sell his car and come back. Counseled patient that he should not leave against medical advice due to active infection and risks. Patient wants to sign out AMA. Risk and complication related to leaving against medical advised described to patient in detail, including risk of sepsis, gangrene, and death. patient understands risk and yet wants to leave hospital. He states that he will come back to the ED right after he "sells his car" for further treatment. He declined prescriptions at this time because he states he will come back.     Discussed with attending.   This is only a brief summary. Please refer to the chart for the full hospital course.

## 2023-02-20 NOTE — DISCHARGE NOTE PROVIDER - NSDCCPCAREPLAN_GEN_ALL_CORE_FT
PRINCIPAL DISCHARGE DIAGNOSIS  Diagnosis: Cellulitis  Assessment and Plan of Treatment: You have been diagnosed with cellulitis. This is an infection in the deepest layers of the skin. The infection may even spread to the muscle in some cases. Cellulitis is caused by bacteria. The bacteria can enter the body through broken skin. This can happen with a cut, scratch, animal bite, or an insect bite that has been scratched. You were started on IV antibiotics. You decided to leave against medical advice and declined prescriptions and stated you would return to the hospital. If left untreated, risks, such as sepsis, gangrene, infection and death were explained to you.      SECONDARY DISCHARGE DIAGNOSES  Diagnosis: DM (diabetes mellitus)  Assessment and Plan of Treatment: Continue with your blood sugar medication.   Please check your blood sugar levels twice daily - Morning/Afternoon, and Lunch/Bedtime the following day. Keep a record of your blood sugar readings  You must maintain a healthy diet that consists of low sugar and low fat. Your diet must consist of mostly vegetables. Please limit your intake of high sugar and high carbohydrate foods such as pasta, rice, and bread. Exercise frequently if possible. Follow up with primary care physician within one week of your discharge to further manage your diabetes and monitor your Hemoglobin A1c levels after 3 months to evaluate your diabetes control.  You decided to leave against medical advice and declined prescriptions. It is very important you received treatment.

## 2023-02-20 NOTE — PROGRESS NOTE ADULT - PROBLEM SELECTOR PLAN 2
p/w right foot pain, unable to bear weight x1wk  (+) Right distal hallux tuft eschar w/out fluctuance; Right dorsal 2nd and 3rd digit wounds w/ fibro-necrotic changes w/out drainage or fluctuance. No LE edema  XR RT foot: no evidence of OM; f/u official read   s/p Clindamycin and Ketoralac in the ED   c/w Clindamycin   US duplex negative  Podiatry consulted  -Ordered CLAUDETTE/PVR  - ESR/CRP: elevated  - f/u MRI right foot r/o osteo   - bedside I&D with podiatry 2/20  - f/u Bcx and wound reagan

## 2023-02-20 NOTE — CONSULT NOTE ADULT - ASSESSMENT
32M from home, PMHx DM and HLD not taking any medications, p/w pain and swelling of right toes x1wk. Found to have uncont dm. Pt admits to wt loss and polyurea.   rec-lantus 15 units  add admelog 4 ac tid  insulin tx as out pt d/w pt and prim team  dm teaching  nutrition ronal

## 2023-02-20 NOTE — PROGRESS NOTE ADULT - PROBLEM SELECTOR PLAN 4
previous DM dx, loss to follow up  dose not have PCP and not on any meds   Will need PCP established upon DC  Endocrine Dr. Ko following   Lantus 15 u and Admelog 4 TID   nutrition consult
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
## 2023-02-20 NOTE — PROGRESS NOTE ADULT - PROBLEM SELECTOR PLAN 3
h/o HLD not taking meds, diet controlled  Total Cholesterol 239    HDL 46    ASCVD 6% CVD risk in next 10 years   will start Atorvastatin 40mg for risk factors and already failed with lifestyle modifications

## 2023-02-21 VITALS
SYSTOLIC BLOOD PRESSURE: 143 MMHG | OXYGEN SATURATION: 97 % | HEART RATE: 95 BPM | TEMPERATURE: 98 F | RESPIRATION RATE: 20 BRPM | DIASTOLIC BLOOD PRESSURE: 86 MMHG

## 2023-02-21 DIAGNOSIS — R46.89 OTHER SYMPTOMS AND SIGNS INVOLVING APPEARANCE AND BEHAVIOR: ICD-10-CM

## 2023-02-21 DIAGNOSIS — F17.200 NICOTINE DEPENDENCE, UNSPECIFIED, UNCOMPLICATED: ICD-10-CM

## 2023-02-21 DIAGNOSIS — E11.65 TYPE 2 DIABETES MELLITUS WITH HYPERGLYCEMIA: ICD-10-CM

## 2023-02-21 DIAGNOSIS — E11.621 TYPE 2 DIABETES MELLITUS WITH FOOT ULCER: ICD-10-CM

## 2023-02-21 DIAGNOSIS — T25.031S: ICD-10-CM

## 2023-02-21 DIAGNOSIS — E78.5 HYPERLIPIDEMIA, UNSPECIFIED: ICD-10-CM

## 2023-02-21 DIAGNOSIS — Z29.9 ENCOUNTER FOR PROPHYLACTIC MEASURES, UNSPECIFIED: ICD-10-CM

## 2023-02-21 PROBLEM — E11.9 TYPE 2 DIABETES MELLITUS WITHOUT COMPLICATIONS: Chronic | Status: ACTIVE | Noted: 2023-02-19

## 2023-02-21 LAB
ALBUMIN SERPL ELPH-MCNC: 2.6 G/DL — LOW (ref 3.5–5)
ALBUMIN SERPL ELPH-MCNC: 2.7 G/DL — LOW (ref 3.5–5)
ALP SERPL-CCNC: 111 U/L — SIGNIFICANT CHANGE UP (ref 40–120)
ALP SERPL-CCNC: 115 U/L — SIGNIFICANT CHANGE UP (ref 40–120)
ALT FLD-CCNC: 28 U/L DA — SIGNIFICANT CHANGE UP (ref 10–60)
ALT FLD-CCNC: 29 U/L DA — SIGNIFICANT CHANGE UP (ref 10–60)
ANION GAP SERPL CALC-SCNC: 10 MMOL/L — SIGNIFICANT CHANGE UP (ref 5–17)
ANION GAP SERPL CALC-SCNC: 10 MMOL/L — SIGNIFICANT CHANGE UP (ref 5–17)
ANISOCYTOSIS BLD QL: SLIGHT — SIGNIFICANT CHANGE UP
AST SERPL-CCNC: 11 U/L — SIGNIFICANT CHANGE UP (ref 10–40)
AST SERPL-CCNC: 18 U/L — SIGNIFICANT CHANGE UP (ref 10–40)
BASOPHILS # BLD AUTO: 0 K/UL — SIGNIFICANT CHANGE UP (ref 0–0.2)
BASOPHILS # BLD AUTO: 0.08 K/UL — SIGNIFICANT CHANGE UP (ref 0–0.2)
BASOPHILS NFR BLD AUTO: 0 % — SIGNIFICANT CHANGE UP (ref 0–2)
BASOPHILS NFR BLD AUTO: 0.6 % — SIGNIFICANT CHANGE UP (ref 0–2)
BILIRUB SERPL-MCNC: 0.2 MG/DL — SIGNIFICANT CHANGE UP (ref 0.2–1.2)
BILIRUB SERPL-MCNC: 0.2 MG/DL — SIGNIFICANT CHANGE UP (ref 0.2–1.2)
BUN SERPL-MCNC: 19 MG/DL — HIGH (ref 7–18)
BUN SERPL-MCNC: 20 MG/DL — HIGH (ref 7–18)
CALCIUM SERPL-MCNC: 8.9 MG/DL — SIGNIFICANT CHANGE UP (ref 8.4–10.5)
CALCIUM SERPL-MCNC: 9.1 MG/DL — SIGNIFICANT CHANGE UP (ref 8.4–10.5)
CHLORIDE SERPL-SCNC: 101 MMOL/L — SIGNIFICANT CHANGE UP (ref 96–108)
CHLORIDE SERPL-SCNC: 98 MMOL/L — SIGNIFICANT CHANGE UP (ref 96–108)
CO2 SERPL-SCNC: 25 MMOL/L — SIGNIFICANT CHANGE UP (ref 22–31)
CO2 SERPL-SCNC: 25 MMOL/L — SIGNIFICANT CHANGE UP (ref 22–31)
CREAT SERPL-MCNC: 0.94 MG/DL — SIGNIFICANT CHANGE UP (ref 0.5–1.3)
CREAT SERPL-MCNC: 0.98 MG/DL — SIGNIFICANT CHANGE UP (ref 0.5–1.3)
CRP SERPL-MCNC: 3 MG/L — SIGNIFICANT CHANGE UP
DACRYOCYTES BLD QL SMEAR: SLIGHT — SIGNIFICANT CHANGE UP
EGFR: 106 ML/MIN/1.73M2 — SIGNIFICANT CHANGE UP
EGFR: 112 ML/MIN/1.73M2 — SIGNIFICANT CHANGE UP
EOSINOPHIL # BLD AUTO: 0.13 K/UL — SIGNIFICANT CHANGE UP (ref 0–0.5)
EOSINOPHIL # BLD AUTO: 0.33 K/UL — SIGNIFICANT CHANGE UP (ref 0–0.5)
EOSINOPHIL NFR BLD AUTO: 1 % — SIGNIFICANT CHANGE UP (ref 0–6)
EOSINOPHIL NFR BLD AUTO: 2.6 % — SIGNIFICANT CHANGE UP (ref 0–6)
ERYTHROCYTE [SEDIMENTATION RATE] IN BLOOD: 48 MM/HR — HIGH (ref 0–15)
GLUCOSE BLDC GLUCOMTR-MCNC: 209 MG/DL — HIGH (ref 70–99)
GLUCOSE BLDC GLUCOMTR-MCNC: 224 MG/DL — HIGH (ref 70–99)
GLUCOSE BLDC GLUCOMTR-MCNC: 250 MG/DL — HIGH (ref 70–99)
GLUCOSE BLDC GLUCOMTR-MCNC: 329 MG/DL — HIGH (ref 70–99)
GLUCOSE SERPL-MCNC: 331 MG/DL — HIGH (ref 70–99)
GLUCOSE SERPL-MCNC: 342 MG/DL — HIGH (ref 70–99)
HCT VFR BLD CALC: 41.7 % — SIGNIFICANT CHANGE UP (ref 39–50)
HCT VFR BLD CALC: 42.6 % — SIGNIFICANT CHANGE UP (ref 39–50)
HGB BLD-MCNC: 13.4 G/DL — SIGNIFICANT CHANGE UP (ref 13–17)
HGB BLD-MCNC: 14 G/DL — SIGNIFICANT CHANGE UP (ref 13–17)
IMM GRANULOCYTES NFR BLD AUTO: 0.4 % — SIGNIFICANT CHANGE UP (ref 0–0.9)
LG PLATELETS BLD QL AUTO: SLIGHT — SIGNIFICANT CHANGE UP
LYMPHOCYTES # BLD AUTO: 26.4 % — SIGNIFICANT CHANGE UP (ref 13–44)
LYMPHOCYTES # BLD AUTO: 3.34 K/UL — HIGH (ref 1–3.3)
LYMPHOCYTES # BLD AUTO: 35 % — SIGNIFICANT CHANGE UP (ref 13–44)
LYMPHOCYTES # BLD AUTO: 4.56 K/UL — HIGH (ref 1–3.3)
MAGNESIUM SERPL-MCNC: 1.9 MG/DL — SIGNIFICANT CHANGE UP (ref 1.6–2.6)
MANUAL SMEAR VERIFICATION: SIGNIFICANT CHANGE UP
MCHC RBC-ENTMCNC: 24.9 PG — LOW (ref 27–34)
MCHC RBC-ENTMCNC: 25.2 PG — LOW (ref 27–34)
MCHC RBC-ENTMCNC: 32.1 GM/DL — SIGNIFICANT CHANGE UP (ref 32–36)
MCHC RBC-ENTMCNC: 32.9 GM/DL — SIGNIFICANT CHANGE UP (ref 32–36)
MCV RBC AUTO: 76.6 FL — LOW (ref 80–100)
MCV RBC AUTO: 77.5 FL — LOW (ref 80–100)
MICROCYTES BLD QL: SLIGHT — SIGNIFICANT CHANGE UP
MONOCYTES # BLD AUTO: 0.66 K/UL — SIGNIFICANT CHANGE UP (ref 0–0.9)
MONOCYTES # BLD AUTO: 1.17 K/UL — HIGH (ref 0–0.9)
MONOCYTES NFR BLD AUTO: 5.2 % — SIGNIFICANT CHANGE UP (ref 2–14)
MONOCYTES NFR BLD AUTO: 9 % — SIGNIFICANT CHANGE UP (ref 2–14)
NEUTROPHILS # BLD AUTO: 7.17 K/UL — SIGNIFICANT CHANGE UP (ref 1.8–7.4)
NEUTROPHILS # BLD AUTO: 8.17 K/UL — HIGH (ref 1.8–7.4)
NEUTROPHILS NFR BLD AUTO: 55 % — SIGNIFICANT CHANGE UP (ref 43–77)
NEUTROPHILS NFR BLD AUTO: 64.8 % — SIGNIFICANT CHANGE UP (ref 43–77)
NRBC # BLD: 0 /100 WBCS — SIGNIFICANT CHANGE UP (ref 0–0)
NRBC # BLD: 0 /100 — SIGNIFICANT CHANGE UP (ref 0–0)
OVALOCYTES BLD QL SMEAR: SIGNIFICANT CHANGE UP
PHOSPHATE SERPL-MCNC: 3.8 MG/DL — SIGNIFICANT CHANGE UP (ref 2.5–4.5)
PLAT MORPH BLD: NORMAL — SIGNIFICANT CHANGE UP
PLATELET # BLD AUTO: 332 K/UL — SIGNIFICANT CHANGE UP (ref 150–400)
PLATELET # BLD AUTO: 343 K/UL — SIGNIFICANT CHANGE UP (ref 150–400)
PLATELET COUNT - ESTIMATE: ABNORMAL
POIKILOCYTOSIS BLD QL AUTO: SLIGHT — SIGNIFICANT CHANGE UP
POLYCHROMASIA BLD QL SMEAR: SLIGHT — SIGNIFICANT CHANGE UP
POTASSIUM SERPL-MCNC: 3.9 MMOL/L — SIGNIFICANT CHANGE UP (ref 3.5–5.3)
POTASSIUM SERPL-MCNC: 4.4 MMOL/L — SIGNIFICANT CHANGE UP (ref 3.5–5.3)
POTASSIUM SERPL-SCNC: 3.9 MMOL/L — SIGNIFICANT CHANGE UP (ref 3.5–5.3)
POTASSIUM SERPL-SCNC: 4.4 MMOL/L — SIGNIFICANT CHANGE UP (ref 3.5–5.3)
PROT SERPL-MCNC: 7.4 G/DL — SIGNIFICANT CHANGE UP (ref 6–8.3)
PROT SERPL-MCNC: 7.7 G/DL — SIGNIFICANT CHANGE UP (ref 6–8.3)
RBC # BLD: 5.38 M/UL — SIGNIFICANT CHANGE UP (ref 4.2–5.8)
RBC # BLD: 5.56 M/UL — SIGNIFICANT CHANGE UP (ref 4.2–5.8)
RBC # FLD: 12.3 % — SIGNIFICANT CHANGE UP (ref 10.3–14.5)
RBC # FLD: 12.5 % — SIGNIFICANT CHANGE UP (ref 10.3–14.5)
RBC BLD AUTO: ABNORMAL
SARS-COV-2 RNA SPEC QL NAA+PROBE: SIGNIFICANT CHANGE UP
SMUDGE CELLS # BLD: PRESENT — SIGNIFICANT CHANGE UP
SODIUM SERPL-SCNC: 133 MMOL/L — LOW (ref 135–145)
SODIUM SERPL-SCNC: 136 MMOL/L — SIGNIFICANT CHANGE UP (ref 135–145)
WBC # BLD: 12.63 K/UL — HIGH (ref 3.8–10.5)
WBC # BLD: 13.04 K/UL — HIGH (ref 3.8–10.5)
WBC # FLD AUTO: 12.63 K/UL — HIGH (ref 3.8–10.5)
WBC # FLD AUTO: 13.04 K/UL — HIGH (ref 3.8–10.5)

## 2023-02-21 PROCEDURE — 99223 1ST HOSP IP/OBS HIGH 75: CPT | Mod: GC

## 2023-02-21 PROCEDURE — 73720 MRI LWR EXTREMITY W/O&W/DYE: CPT | Mod: 26,RT

## 2023-02-21 RX ORDER — COLLAGENASE CLOSTRIDIUM HIST. 250 UNIT/G
1 OINTMENT (GRAM) TOPICAL DAILY
Refills: 0 | Status: DISCONTINUED | OUTPATIENT
Start: 2023-02-21 | End: 2023-02-22

## 2023-02-21 RX ORDER — INSULIN LISPRO 100/ML
VIAL (ML) SUBCUTANEOUS AT BEDTIME
Refills: 0 | Status: DISCONTINUED | OUTPATIENT
Start: 2023-02-21 | End: 2023-02-22

## 2023-02-21 RX ORDER — INSULIN LISPRO 100/ML
4 VIAL (ML) SUBCUTANEOUS
Refills: 0 | Status: DISCONTINUED | OUTPATIENT
Start: 2023-02-21 | End: 2023-02-22

## 2023-02-21 RX ORDER — ACETAMINOPHEN 500 MG
650 TABLET ORAL EVERY 6 HOURS
Refills: 0 | Status: DISCONTINUED | OUTPATIENT
Start: 2023-02-21 | End: 2023-02-22

## 2023-02-21 RX ORDER — ATORVASTATIN CALCIUM 80 MG/1
40 TABLET, FILM COATED ORAL AT BEDTIME
Refills: 0 | Status: DISCONTINUED | OUTPATIENT
Start: 2023-02-21 | End: 2023-02-22

## 2023-02-21 RX ORDER — CEFTRIAXONE 500 MG/1
INJECTION, POWDER, FOR SOLUTION INTRAMUSCULAR; INTRAVENOUS
Refills: 0 | Status: DISCONTINUED | OUTPATIENT
Start: 2023-02-21 | End: 2023-02-22

## 2023-02-21 RX ORDER — LANOLIN ALCOHOL/MO/W.PET/CERES
3 CREAM (GRAM) TOPICAL AT BEDTIME
Refills: 0 | Status: DISCONTINUED | OUTPATIENT
Start: 2023-02-21 | End: 2023-02-22

## 2023-02-21 RX ORDER — CEFTRIAXONE 500 MG/1
2000 INJECTION, POWDER, FOR SOLUTION INTRAMUSCULAR; INTRAVENOUS EVERY 24 HOURS
Refills: 0 | Status: DISCONTINUED | OUTPATIENT
Start: 2023-02-22 | End: 2023-02-22

## 2023-02-21 RX ORDER — DEXTROSE 50 % IN WATER 50 %
25 SYRINGE (ML) INTRAVENOUS ONCE
Refills: 0 | Status: DISCONTINUED | OUTPATIENT
Start: 2023-02-21 | End: 2023-02-22

## 2023-02-21 RX ORDER — CEFTRIAXONE 500 MG/1
2000 INJECTION, POWDER, FOR SOLUTION INTRAMUSCULAR; INTRAVENOUS ONCE
Refills: 0 | Status: COMPLETED | OUTPATIENT
Start: 2023-02-21 | End: 2023-02-21

## 2023-02-21 RX ORDER — DEXTROSE 50 % IN WATER 50 %
12.5 SYRINGE (ML) INTRAVENOUS ONCE
Refills: 0 | Status: DISCONTINUED | OUTPATIENT
Start: 2023-02-21 | End: 2023-02-22

## 2023-02-21 RX ORDER — VANCOMYCIN HCL 1 G
1000 VIAL (EA) INTRAVENOUS ONCE
Refills: 0 | Status: COMPLETED | OUTPATIENT
Start: 2023-02-21 | End: 2023-02-21

## 2023-02-21 RX ORDER — GLUCAGON INJECTION, SOLUTION 0.5 MG/.1ML
1 INJECTION, SOLUTION SUBCUTANEOUS ONCE
Refills: 0 | Status: DISCONTINUED | OUTPATIENT
Start: 2023-02-21 | End: 2023-02-22

## 2023-02-21 RX ORDER — DEXTROSE 50 % IN WATER 50 %
15 SYRINGE (ML) INTRAVENOUS ONCE
Refills: 0 | Status: DISCONTINUED | OUTPATIENT
Start: 2023-02-21 | End: 2023-02-22

## 2023-02-21 RX ORDER — ONDANSETRON 8 MG/1
4 TABLET, FILM COATED ORAL EVERY 8 HOURS
Refills: 0 | Status: DISCONTINUED | OUTPATIENT
Start: 2023-02-21 | End: 2023-02-22

## 2023-02-21 RX ORDER — SODIUM CHLORIDE 9 MG/ML
1000 INJECTION, SOLUTION INTRAVENOUS
Refills: 0 | Status: DISCONTINUED | OUTPATIENT
Start: 2023-02-21 | End: 2023-02-22

## 2023-02-21 RX ORDER — VANCOMYCIN HCL 1 G
VIAL (EA) INTRAVENOUS
Refills: 0 | Status: DISCONTINUED | OUTPATIENT
Start: 2023-02-21 | End: 2023-02-22

## 2023-02-21 RX ORDER — INSULIN GLARGINE 100 [IU]/ML
15 INJECTION, SOLUTION SUBCUTANEOUS AT BEDTIME
Refills: 0 | Status: DISCONTINUED | OUTPATIENT
Start: 2023-02-21 | End: 2023-02-22

## 2023-02-21 RX ORDER — INSULIN LISPRO 100/ML
VIAL (ML) SUBCUTANEOUS
Refills: 0 | Status: DISCONTINUED | OUTPATIENT
Start: 2023-02-21 | End: 2023-02-22

## 2023-02-21 RX ORDER — VANCOMYCIN HCL 1 G
1000 VIAL (EA) INTRAVENOUS EVERY 12 HOURS
Refills: 0 | Status: DISCONTINUED | OUTPATIENT
Start: 2023-02-22 | End: 2023-02-22

## 2023-02-21 RX ADMIN — Medication 100 MILLIGRAM(S): at 05:30

## 2023-02-21 RX ADMIN — Medication 4 UNIT(S): at 12:30

## 2023-02-21 RX ADMIN — ATORVASTATIN CALCIUM 40 MILLIGRAM(S): 80 TABLET, FILM COATED ORAL at 21:55

## 2023-02-21 RX ADMIN — Medication 100 MILLIGRAM(S): at 14:57

## 2023-02-21 RX ADMIN — Medication 4: at 17:07

## 2023-02-21 RX ADMIN — CEFTRIAXONE 100 MILLIGRAM(S): 500 INJECTION, POWDER, FOR SOLUTION INTRAMUSCULAR; INTRAVENOUS at 21:54

## 2023-02-21 RX ADMIN — Medication 8: at 08:37

## 2023-02-21 RX ADMIN — Medication 4: at 12:30

## 2023-02-21 RX ADMIN — Medication 250 MILLIGRAM(S): at 22:39

## 2023-02-21 RX ADMIN — Medication 1 APPLICATION(S): at 12:34

## 2023-02-21 RX ADMIN — Medication 4 UNIT(S): at 17:07

## 2023-02-21 RX ADMIN — Medication 4 UNIT(S): at 08:32

## 2023-02-21 RX ADMIN — INSULIN GLARGINE 15 UNIT(S): 100 INJECTION, SOLUTION SUBCUTANEOUS at 21:55

## 2023-02-21 NOTE — H&P ADULT - PROBLEM SELECTOR PLAN 2
p/w Serum glucose 667 and Serum OSm 299, not in DKA , not acidotic, and negative Acetone   h/o DM dx 4 years ago, untreated only managed with weight loss, loss to f/u  s/p 2L in ED, with improvement FS now 331  A1c: 15.5  Lantus 15 and Admelog 4 TID   c/w moderate sliding scale  Carbohydrate Consistent Diet  FS ACHS  UA no ketonuria   Endocrinology re-consulted: Dr. Ko  Nutrition re-consulted.
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
## 2023-02-21 NOTE — H&P ADULT - ATTENDING COMMENTS
Patient seen and examined at bedside.    Vital Signs Last 24 Hrs  T(C): 37.2 (21 Feb 2023 02:45), Max: 37.2 (21 Feb 2023 02:45)  T(F): 99 (21 Feb 2023 02:45), Max: 99 (21 Feb 2023 02:45)  HR: 94 (21 Feb 2023 02:45) (82 - 94)  BP: 101/66 (21 Feb 2023 02:45) (101/66 - 118/83)  BP(mean): --  RR: 16 (21 Feb 2023 02:45) (16 - 18)  SpO2: 99% (21 Feb 2023 02:45) (97% - 99%)  Parameters below as of 21 Feb 2023 02:45  Patient On (Oxygen Delivery Method): room air    Labs and imaging studies noted.    Assessment and plan:     R foot Diabetic ulcer  Uncontrolled DM   HLD  Tobacco use Patient seen and examined at bedside.    Vital Signs Last 24 Hrs  T(C): 37.2 (21 Feb 2023 02:45), Max: 37.2 (21 Feb 2023 02:45)  T(F): 99 (21 Feb 2023 02:45), Max: 99 (21 Feb 2023 02:45)  HR: 94 (21 Feb 2023 02:45) (82 - 94)  BP: 101/66 (21 Feb 2023 02:45) (101/66 - 118/83)  BP(mean): --  RR: 16 (21 Feb 2023 02:45) (16 - 18)  SpO2: 99% (21 Feb 2023 02:45) (97% - 99%)  Parameters below as of 21 Feb 2023 02:45  Patient On (Oxygen Delivery Method): room air    Labs and imaging studies noted.    Assessment and plan:     # R foot Diabetic ulcer  # Uncontrolled DM   # HLD  # Tobacco use 33 yo M from home, PMHx DM and HLD not taking any medications, returned from AMA earlier today for foot cellulitis and uncontrolled DM. Originally admitted 2/19 for pain and swelling of right toes x1wk and found to have elevated glucose>600 w/ HbA1c >15.5. He reports one week ago he slept in his car because he couldn't finding parking, slept with bare feet on the heater and woke up the next morning with foot swollen and painful. States he has been using neosporin with slight improvement in pain and swelling, but today the pain became worse. Denies trauma. Denies fevers and chills, but now states that he is unable to bear weight making it difficult for him to walk. Admits that he was told in the past about his DM 4 years ago but he attributed it to being overweight, he lost weight intentionally but never followed up and was never started on any medications, and does not follow PCP. Reports increased thirst drinking about 1 gallon/day and has increased frequency in urination, but denies dysuria, abdominal pain n/v/d, and no weight loss.     Hospital course 2/19/23-2/20/23:   Seen by Endocrinologist, Maikel Bess - recommended Lantus 15u, Admelog 4u TID w/ meals, and ISS  Seen by podiatry s/p right foot 1st and 2nd digit debridement and I&D of right third digit abscess 2/20 - recommended c/w abx and MRI of foot for deep wounds to r/o OM.   Signed out AMA as patient needed to sell his car, returned back to the ED same day.    Patient seen and examined at bedside.  Vital Signs Last 24 Hrs  T(C): 37.2 (21 Feb 2023 02:45), Max: 37.2 (21 Feb 2023 02:45)  T(F): 99 (21 Feb 2023 02:45), Max: 99 (21 Feb 2023 02:45)  HR: 94 (21 Feb 2023 02:45) (82 - 94)  BP: 101/66 (21 Feb 2023 02:45) (101/66 - 118/83)  BP(mean): --  RR: 16 (21 Feb 2023 02:45) (16 - 18)  SpO2: 99% (21 Feb 2023 02:45) (97% - 99%)  Parameters below as of 21 Feb 2023 02:45  Patient On (Oxygen Delivery Method): room air    Labs and imaging studies noted.    Assessment and plan:     # R foot Diabetic ulcer  # Uncontrolled DM   # HLD  # Tobacco use 33 yo M from home, PMHx DM and HLD not taking any medications, returned from AMA d/c earlier today for foot cellulitis and uncontrolled DM. Originally admitted 2/19 for pain and swelling of right toes x1wk and found to have elevated glucose>600 w/ HbA1c >15.5. He reports one week ago he slept in his car because he couldn't finding parking, slept with bare feet on the heater and woke up the next morning with foot swollen and painful.   Recent admission: Seen by Endocrinologist, Maikel Bess - recommended Lantus 15u, Admelog 4u TID w/ meals, and ISS  Seen by podiatry s/p right foot 1st and 2nd digit debridement and I&D of right third digit abscess 2/20 - recommended c/w abx and MRI of foot for deep wounds to r/o OM.  Patient signed out AMA today feb 20, returned back to the ED same day.    Patient seen and examined at bedside.  Vital Signs Last 24 Hrs  T(C): 37.2 (21 Feb 2023 02:45), Max: 37.2 (21 Feb 2023 02:45)  T(F): 99 (21 Feb 2023 02:45), Max: 99 (21 Feb 2023 02:45)  HR: 94 (21 Feb 2023 02:45) (82 - 94)  BP: 101/66 (21 Feb 2023 02:45) (101/66 - 118/83)  BP(mean): --  RR: 16 (21 Feb 2023 02:45) (16 - 18)  SpO2: 99% (21 Feb 2023 02:45) (97% - 99%)  Parameters below as of 21 Feb 2023 02:45  Patient On (Oxygen Delivery Method): room air  AAox3, NAD  chest CTA b/l, no MRG  abd soft, nt, nd  deep non-healing right 1st, 2md, 3rd digit ulcers, non-bleeding     Labs and imaging studies noted.  wbc 12k, , A1c 15%  XR RT foot: no evidence of OM or fracture    Assessment and plan: 33 yo M from home, PMHx DM and HLD not taking any medications, returned from AMA d/c earlier today for foot cellulitis and uncontrolled DM. s/p bedside I and D by podiatry recent admission.    # R foot Diabetic ulcer  # Uncontrolled DM   # HLD  # Tobacco use    Plan:   - c/w iv clindamycin  - Podiatry team consulted  - ordered MRI foot  - c/w iv Lantus 15 u qhs, pre meal 4 u, SSI,   - Atorvastatin 40 mg od  - Diabetic education  - declined nicotine patch  - Patient uninsured, SW consult, will need [facility] meds on d/c.

## 2023-02-21 NOTE — H&P ADULT - PROBLEM SELECTOR PLAN 3
h/o HLD not taking meds, diet controlled  Total Cholesterol 239    HDL 46    ASCVD 6% CVD risk in next 10 years   c/w Atorvastatin 40mg for risk factors and already failed with lifestyle modifications.
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
## 2023-02-21 NOTE — ED PROVIDER NOTE - IV ALTEPLASE ADMIN OUTSIDE HIDDEN
Patient Seen in: [facility] AND [facility] Emergency Department      History   Patient presents with:  Laceration/Abrasion    Stated Complaint: left hand laceration     HPI/Subjective:   26yo/m w no chronic medical problems reports to the ED with left  Abdomen is soft. Musculoskeletal:         General: No tenderness or deformity. Normal range of motion. Cervical back: Normal range of motion and neck supple. Skin:     General: Skin is warm and dry.       Capillary Refill: Capillary refill takes le deficits  Full active rom of hand  No exposed bone or tendon  Easily repaired  tdap updated  Xray w/o foreign body        Plan  Sutures out in 1 week    Counseled patient on home care, ss of worsening condition, reasons for immediate re-eval, importance of show

## 2023-02-21 NOTE — H&P ADULT - NSICDXPASTMEDICALHX_GEN_ALL_CORE_FT
PAST MEDICAL HISTORY:  Diabetic ulcer of right foot     DM (diabetes mellitus)     HLD (hyperlipidemia)
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
## 2023-02-21 NOTE — H&P ADULT - NSHPPHYSICALEXAM_GEN_ALL_CORE
Vital Signs Last 24 Hrs  T(C): 37.2 (21 Feb 2023 02:45), Max: 37.2 (21 Feb 2023 02:45)  T(F): 99 (21 Feb 2023 02:45), Max: 99 (21 Feb 2023 02:45)  HR: 94 (21 Feb 2023 02:45) (82 - 94)  BP: 101/66 (21 Feb 2023 02:45) (101/66 - 118/83)  BP(mean): --  RR: 16 (21 Feb 2023 02:45) (16 - 18)  SpO2: 99% (21 Feb 2023 02:45) (97% - 99%)    Parameters below as of 21 Feb 2023 02:45  Patient On (Oxygen Delivery Method): room air    GENERAL: NAD, lying in bed comfortably  HEAD:  Atraumatic, Normocephalic  EYES: EOMI, PERRLA, conjunctiva and sclera clear  ENT: Moist mucous membranes  NECK: Supple, No JVD  CHEST/LUNG: Clear to auscultation bilaterally; No rales, rhonchi, wheezing, or rubs. Unlabored respirations  HEART: Regular rate and rhythm; No murmurs, rubs, or gallops  ABDOMEN: Bowel sounds present; Soft, Nontender, Nondistended. No hepatomegally  EXTREMITIES:  2+ Peripheral Pulses, brisk capillary refill. No clubbing, cyanosis, or edema (+) deep non-healing right 1st, 2md, 3rd digit ulcers, non-bleeding   NERVOUS SYSTEM:  Alert & Oriented X3, speech clear. No deficits   MSK: FROM all 4 extremities, full and equal strength  SKIN: No rashes or lesions

## 2023-02-21 NOTE — PROGRESS NOTE ADULT - ASSESSMENT
32M from home, PMHx DM and HLD not taking any medications, signed out AMA now returned to continue care. Admitted for Diabetic right foot ulcer and uncontrolled DM, admits to not having insurance will need [facility] meds and diabetic mgnt supplies  prior to DC. Will also need diabetic teaching re FS monitoring, Insulin administration etc.  Podiatry and Endocrinology following.

## 2023-02-21 NOTE — H&P ADULT - HISTORY OF PRESENT ILLNESS
32M from home, PMHx DM and HLD not taking any medications, returned from AMA earlier today for foot cellulitis and uncontrolled DM. Originally admitted 2/19 for pain and swelling of right toes x1wk and found to have elevated glucose>600 w/ HbA1c >15.5. He reports one week ago he slept in his car because he couldn't finding parking, slept with bare feet on the heater and woke up the next morning with foot swollen and painful. States he has been using neosporin with slight improvement in pain and swelling, but today the pain became worse. Denies trauma. Denies fevers and chills, but now states that he is unable to bear weight making it difficult for him to walk. Admits that he was told in the past about his DM 4 years ago but he attributed it to being overweight, he lost weight intentionally but never followed up and was never started on any medications, and does not follow PCP. Reports increased thirst drinking about 1 gallon/day and has increased frequency in urination, but denies dysuria, abdominal pain n/v/d, and no weight loss.     Hospital course 2/19/23-2/20/23:   Seen by Endocrinologist, Maikel Bess - recommended Lantus 15u, Admelog 4u TID w/ meals, and ISS  Seen by podiatry s/p right foot 1st and 2nd digit debridement and I&D of right third digit abscess 2/20 - recommended c/w abx and MRI of foot for deep wounds to r/o OM.   Signed out AMA as patient needed to sell his car, returned back to the ED same day.    Patient denies HA, SOB, chest pain, abdominal pain, or changes in BM.   Reports does not have health insurance, denies being homeless stating he lives with mother and brother.

## 2023-02-21 NOTE — PROGRESS NOTE ADULT - PROBLEM SELECTOR PLAN 2
p/w Serum glucose 667 and Serum OSm 299, not in DKA , not acidotic, and negative Acetone   h/o DM dx 4 years ago, untreated only managed with weight loss, loss to f/u  s/p 2L in ED, with improvement FS now 331  -A1c: 15.5  -Cont Lantus 15 and Admelog 4 TID   -Cont moderate sliding scale  -Carbohydrate Consistent Diet  -FS ACHS  -Endocrinology Dr. Ko following  -Nutrition consult  -Needs DM teaching  -Will need DM supplies and meds from [facility] prior to discharge

## 2023-02-21 NOTE — H&P ADULT - ASSESSMENT
32M from home, PMHx DM and HLD not taking any medications, signed out AMA now returned to continue care. Admitted for Diabetic right foot ulcer and uncontrolled DM, admits to not having insurance will nee [facility] meds prios to [place]. Podiatry and Endocrinology re-consulted.

## 2023-02-21 NOTE — PROGRESS NOTE ADULT - PROBLEM SELECTOR PLAN 3
h/o HLD not taking meds, diet controlled  Total Cholesterol 239    HDL 46    ASCVD 6% CVD risk in next 10 years   c/w Atorvastatin 40mg for risk factors

## 2023-02-21 NOTE — H&P ADULT - PROBLEM SELECTOR PLAN 4
Signed out AMA 2/20 now returned   previous DM and HLD dx; does not have PCP, loss to f/u, and not on any meds   Will need PCP established upon DC  Endocrine Dr. Ko following   Lantus 15 u and Admelog 4 TID   nutrition consulted     Does NOT have health insurance will need [facility] meds prior to DC

## 2023-02-21 NOTE — PROGRESS NOTE ADULT - PROBLEM SELECTOR PLAN 4
Signed out AMA 2/20 now returned   previous DM and HLD dx; does not have PCP, loss to f/u, and not on any meds   Will need PCP established upon DC  Endocrine Dr. Ko following   Lantus 15 u and Admelog 4 TID   nutrition consulted   Does NOT have health insurance will need [facility] meds prior to DC  SW consult

## 2023-02-21 NOTE — ED PROVIDER NOTE - OBJECTIVE STATEMENT
32 y.o presenting for wound to right leg. was admitted recently for same. had + wound culture and drainage by podiatry. patient denies new symptoms. states that he AMA earlier to day to take care of personal errand he couldn't put off. denies new complaints

## 2023-02-21 NOTE — H&P ADULT - PROBLEM SELECTOR PLAN 1
p/w right foot pain, unable to bear weight x1wk  (+) Right distal hallux tuft eschar w/out fluctuance; Right dorsal 2nd and 3rd digit wounds w/ fibro-necrotic changes w/out drainage or fluctuance. No LE edema  XR RT foot: no evidence of OM or fracture  ESR/CRP: elevated  CLAUDETTE/PVR negative for PAD  s/p Clindamycin x2d  c/w Clindamycin   US duplex negative  Podiatry re-consulted   - s/p wound debridement 1st and 2nd right foot digits and I&D of right foot 3rd digit 2/20  - f/u MRI right foot r/o osteo   f/u Bcx and wound reagan.

## 2023-02-21 NOTE — PATIENT PROFILE ADULT - FALL HARM RISK - HARM RISK INTERVENTIONS

## 2023-02-21 NOTE — PROGRESS NOTE ADULT - PROBLEM SELECTOR PLAN 1
p/w right foot 3 toes ulcers associated with pain, now s/p bedside I&D 2/20  -XR RT foot: no evidence of OM or fracture  -ESR/CRP: elevated  -CLAUDETTE/PVR negative for PAD  -Cont Clindamycin  -US duplex negative  -Podiatry following  -f/u MRI right foot r/o osteo   -f/u Bcx and wound reagan.

## 2023-02-22 VITALS
HEART RATE: 81 BPM | SYSTOLIC BLOOD PRESSURE: 96 MMHG | TEMPERATURE: 99 F | OXYGEN SATURATION: 100 % | DIASTOLIC BLOOD PRESSURE: 59 MMHG | RESPIRATION RATE: 18 BRPM

## 2023-02-22 LAB
-  AMPICILLIN/SULBACTAM: SIGNIFICANT CHANGE UP
-  CEFAZOLIN: SIGNIFICANT CHANGE UP
-  CLINDAMYCIN: SIGNIFICANT CHANGE UP
-  ERYTHROMYCIN: SIGNIFICANT CHANGE UP
-  GENTAMICIN: SIGNIFICANT CHANGE UP
-  OXACILLIN: SIGNIFICANT CHANGE UP
-  PENICILLIN: SIGNIFICANT CHANGE UP
-  RIFAMPIN: SIGNIFICANT CHANGE UP
-  TETRACYCLINE: SIGNIFICANT CHANGE UP
-  TRIMETHOPRIM/SULFAMETHOXAZOLE: SIGNIFICANT CHANGE UP
-  VANCOMYCIN: SIGNIFICANT CHANGE UP
ANION GAP SERPL CALC-SCNC: 9 MMOL/L — SIGNIFICANT CHANGE UP (ref 5–17)
BUN SERPL-MCNC: 19 MG/DL — HIGH (ref 7–18)
CALCIUM SERPL-MCNC: 9.4 MG/DL — SIGNIFICANT CHANGE UP (ref 8.4–10.5)
CHLORIDE SERPL-SCNC: 103 MMOL/L — SIGNIFICANT CHANGE UP (ref 96–108)
CO2 SERPL-SCNC: 23 MMOL/L — SIGNIFICANT CHANGE UP (ref 22–31)
CREAT SERPL-MCNC: 0.85 MG/DL — SIGNIFICANT CHANGE UP (ref 0.5–1.3)
EGFR: 120 ML/MIN/1.73M2 — SIGNIFICANT CHANGE UP
GLUCOSE BLDC GLUCOMTR-MCNC: 287 MG/DL — HIGH (ref 70–99)
GLUCOSE SERPL-MCNC: 308 MG/DL — HIGH (ref 70–99)
HCT VFR BLD CALC: 43.1 % — SIGNIFICANT CHANGE UP (ref 39–50)
HGB BLD-MCNC: 13.9 G/DL — SIGNIFICANT CHANGE UP (ref 13–17)
MCHC RBC-ENTMCNC: 25 PG — LOW (ref 27–34)
MCHC RBC-ENTMCNC: 32.3 GM/DL — SIGNIFICANT CHANGE UP (ref 32–36)
MCV RBC AUTO: 77.4 FL — LOW (ref 80–100)
METHOD TYPE: SIGNIFICANT CHANGE UP
NRBC # BLD: 0 /100 WBCS — SIGNIFICANT CHANGE UP (ref 0–0)
PLATELET # BLD AUTO: 326 K/UL — SIGNIFICANT CHANGE UP (ref 150–400)
POTASSIUM SERPL-MCNC: 4.1 MMOL/L — SIGNIFICANT CHANGE UP (ref 3.5–5.3)
POTASSIUM SERPL-SCNC: 4.1 MMOL/L — SIGNIFICANT CHANGE UP (ref 3.5–5.3)
RBC # BLD: 5.57 M/UL — SIGNIFICANT CHANGE UP (ref 4.2–5.8)
RBC # FLD: 12.5 % — SIGNIFICANT CHANGE UP (ref 10.3–14.5)
SODIUM SERPL-SCNC: 135 MMOL/L — SIGNIFICANT CHANGE UP (ref 135–145)
WBC # BLD: 10.58 K/UL — HIGH (ref 3.8–10.5)
WBC # FLD AUTO: 10.58 K/UL — HIGH (ref 3.8–10.5)

## 2023-02-22 PROCEDURE — 87635 SARS-COV-2 COVID-19 AMP PRB: CPT

## 2023-02-22 PROCEDURE — 87040 BLOOD CULTURE FOR BACTERIA: CPT

## 2023-02-22 PROCEDURE — 85652 RBC SED RATE AUTOMATED: CPT

## 2023-02-22 PROCEDURE — 99239 HOSP IP/OBS DSCHRG MGMT >30: CPT

## 2023-02-22 PROCEDURE — 80048 BASIC METABOLIC PNL TOTAL CA: CPT

## 2023-02-22 PROCEDURE — 85025 COMPLETE CBC W/AUTO DIFF WBC: CPT

## 2023-02-22 PROCEDURE — 84100 ASSAY OF PHOSPHORUS: CPT

## 2023-02-22 PROCEDURE — 36415 COLL VENOUS BLD VENIPUNCTURE: CPT

## 2023-02-22 PROCEDURE — A9585: CPT

## 2023-02-22 PROCEDURE — 82962 GLUCOSE BLOOD TEST: CPT

## 2023-02-22 PROCEDURE — 73720 MRI LWR EXTREMITY W/O&W/DYE: CPT

## 2023-02-22 PROCEDURE — 86140 C-REACTIVE PROTEIN: CPT

## 2023-02-22 PROCEDURE — 85027 COMPLETE CBC AUTOMATED: CPT

## 2023-02-22 PROCEDURE — 80053 COMPREHEN METABOLIC PANEL: CPT

## 2023-02-22 PROCEDURE — 99285 EMERGENCY DEPT VISIT HI MDM: CPT

## 2023-02-22 PROCEDURE — 83735 ASSAY OF MAGNESIUM: CPT

## 2023-02-22 RX ORDER — INSULIN GLARGINE 100 [IU]/ML
22 INJECTION, SOLUTION SUBCUTANEOUS AT BEDTIME
Refills: 0 | Status: DISCONTINUED | OUTPATIENT
Start: 2023-02-22 | End: 2023-02-22

## 2023-02-22 RX ADMIN — Medication 250 MILLIGRAM(S): at 06:03

## 2023-02-22 RX ADMIN — Medication 6: at 08:02

## 2023-02-22 RX ADMIN — Medication 4 UNIT(S): at 08:02

## 2023-02-22 NOTE — CHART NOTE - NSCHARTNOTEFT_GEN_A_CORE
Patient stating he will leave AMA to sell his car. States he is in a financial situation which has been affecting his mental health  Discussed MRI findings with patients, explained osteomyelitis findings of R foot 1st digit distal phalanx and middle phalanges, second digit proximal and middle phalanges and third digit proximal and middle distal phalanx.   Discussed options of surgical intervention of transmetatarsal of R foot vs IV abx 6 weeks  patient not amenable for surgery  Patient states he will return later in the afternoon to be re-admitted Patient stating he will leave AMA to sell his car. States he is in a financial situation which has been affecting his mental health  Discussed MRI findings with patients, explained osteomyelitis findings of R foot 1st digit distal phalanx and middle phalanges, second digit proximal and middle phalanges and third digit proximal and middle distal phalanx.   Discussed options of surgical intervention of transmetatarsal of R foot vs IV abx 6 weeks  patient not amenable for surgery  Discussed severe risks of leaving AMA and risk of a more proximal life threatening amputation   Patient states he will return later in the afternoon to be re-admitted  Dressing appeared clean dry and intact to r foot from morning Dressing change  attending Podiatrist made aware  Discussed with Dr. Espinoza  D/w Dr. Maikel ch

## 2023-02-22 NOTE — DISCHARGE NOTE PROVIDER - HOSPITAL COURSE
32 M from home, PMHx DM and HLD not taking any medications, signed out AMA and  returned to continue care. Admitted for Diabetic right foot ulcer and uncontrolled DM.   Started on Clindamycin   Podiatry consulted for wound care   Performed Bedside Incision and drainage of complex abscess of Right 3rd toe dorsal; 1 cc of purulent drainage noted,   US duplex to RLE due to h/o smoking and calf tenderness performed and negative for DVT   MRI performed and showed   1.Osteomyelitis of the first digit distal phalanx, second digit   proximal and middle phalanges, and third digit proximal, middle, and   distal phalanges. Suspect septic arthritis at the second and third PIP   joints.  2.   Nonspecific marrow edema focally present at the medial base of first   digit proximal phalanx, may represent reactive osteitis or could be   related to altered biomechanics.  Podiatry recommending right TMA     Noted with HgA1C of 15.5   Serum glucose 667 and Serum OSm 299, not in DKA , not acidotic, and negative Acetone   Endocrinology consulted   placed on Insuline     Pt requested to be discharged against medical advise states "I have to take care of personal issues"   Risks and consequences of leaving without completion of medical evaluation and treatment explained extensively in details by me, the attending, podiatry team and endocrinologist   Risks such as worsening infection, sepsis, foot/leg amputation, diabetic coma and death explained  Pt instructed to return to go to preferred hospital such as [place] or return to [facility] as soon as possible to complete the treatment   Pt verbalizes understanding of importance of further treatment and agrees with  above instructions.   Results of imaging, blood work provided   Pt instructed to take all the results with him to [facility]       Pt refused to wait for written discharge instruction, Left the hospital in stable condition 32 M from home, PMHx DM and HLD not taking any medications, signed out AMA and  returned to continue care. Admitted for Diabetic right foot ulcer and uncontrolled DM.   Started on Clindamycin   Podiatry consulted for wound care   Performed Bedside Incision and drainage of complex abscess of Right 3rd toe dorsal; 1 cc of purulent drainage noted,   US duplex to RLE due to h/o smoking and calf tenderness performed and negative for DVT   MRI performed and showed   1.Osteomyelitis of the first digit distal phalanx, second digit   proximal and middle phalanges, and third digit proximal, middle, and   distal phalanges. Suspect septic arthritis at the second and third PIP   joints.  2.   Nonspecific marrow edema focally present at the medial base of first   digit proximal phalanx, may represent reactive osteitis or could be   related to altered biomechanics.  Podiatry recommending right TMA     Noted with HgA1C of 15.5   Serum glucose 667 and Serum OSm 299, not in DKA , not acidotic, and negative Acetone   Endocrinology consulted   placed on Insuline     Pt requested to be discharged against medical advise states "I have to take care of personal issues"   Risks and consequences of leaving without completion of medical evaluation and treatment explained extensively in details by me, the attending, podiatry team and endocrinologist   Risks such as worsening infection, sepsis, foot/leg amputation, diabetic coma and death explained  Pt instructed to return to go to preferred hospital such as [place] or return to [facility] as soon as possible to complete the treatment   Pt verbalizes understanding of importance of further treatment and agrees with  above instructions.   Results of imaging, blood work provided   Pt instructed to take all the results with him to [facility]   Patient did not provide any pharmacy name to send prescriptions     Pt refused to wait for written discharge instruction, Left the hospital in stable condition

## 2023-02-22 NOTE — DISCHARGE NOTE PROVIDER - NSDCCPCAREPLAN_GEN_ALL_CORE_FT
PRINCIPAL DISCHARGE DIAGNOSIS  Diagnosis: Acute osteomyelitis  Assessment and Plan of Treatment: You were admitted for  Diabetic right foot ulcer and uncontrolled  Started on antibiotic   MRI performed and showed   1.Osteomyelitis of the first digit distal phalanx, second digit   proximal and middle phalanges, and third digit proximal, middle, and   distal phalanges. Suspect septic arthritis at the second and third PIP   joints.  2.   Nonspecific marrow edema focally present at the medial base of first   digit proximal phalanx, may represent reactive osteitis or could be   related to altered biomechanics.  Podiatry recommending right partial foot amputation   You  requested to be discharged against medical advise  Risks and consequences of leaving without completion of medical evaluation and treatment include but are not limited to: infection, sepsis, foot/leg amputation, diabetic coma and death   You must go to preferred hospital such as [place] or return to [facility] as soon as possible to complete the treatment   Please take all the provided results of imaging/MRI  with you to [facility]         SECONDARY DISCHARGE DIAGNOSES  Diagnosis: Uncontrolled diabetes mellitus with hyperglycemia  Assessment and Plan of Treatment: Your blood glucose level is very high   Your hemoglobin A1c is 15.5   You need to initiate diabetic treatment as soon as possible   Please go to the [facility] as soon as posible or return to [facility] to continue teatment

## 2023-02-22 NOTE — DISCHARGE NOTE PROVIDER - CARE PROVIDER_API CALL
Mireille Ko)  EndocrinologyMetabDiabetes  [phone] [place] [address]  Phone: [phone]  Fax: [phone]  Follow Up Time:     JESIKA HIGGINS  Podiatry  Phone: ()-  Fax: ()-  Follow Up Time:
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
## 2023-02-24 LAB
CULTURE RESULTS: SIGNIFICANT CHANGE UP
ORGANISM # SPEC MICROSCOPIC CNT: SIGNIFICANT CHANGE UP
ORGANISM # SPEC MICROSCOPIC CNT: SIGNIFICANT CHANGE UP
SPECIMEN SOURCE: SIGNIFICANT CHANGE UP

## 2023-02-26 LAB
CULTURE RESULTS: SIGNIFICANT CHANGE UP
CULTURE RESULTS: SIGNIFICANT CHANGE UP
SPECIMEN SOURCE: SIGNIFICANT CHANGE UP
SPECIMEN SOURCE: SIGNIFICANT CHANGE UP

## 2023-03-03 NOTE — ED ADULT NURSE NOTE - PAIN: PRESENCE, MLM
Dr. Paulo De La Torre called Dr. Yazmin Lopez and left a VM for him to call back.
Dr. Veronica Musa spoke with Dr. Cindy Guerrero.
Please call Dr Maris Hercules regarding patient's post op care  [phone]
complains of pain/discomfort

## 2024-05-10 NOTE — H&P ADULT - NSHPPOAPRESSUREULCER_GEN_ALL_CORE
PATIENT INSTRUCTIONS    Treatment:  Ice: Apply ice for 20 minutes 3 times a day. No barrier between the ice and skin is needed when using cubes or frozen peas. If you are using a chemical ice pack please place a barrier between the ice pack and your skin.        Take tylenol as needed for pain.    Mobic: take 1 tablet 1 time a day for 10 days. Take with food.  Do not take any other anti-inflammatory medication while taking this medication.    Wear tall cam boot at all times and remain nonweightbearing.    Follow-Up:  Please make an appointment with  Dr. Rich Wheat in 4 weeks    Time left: 5/10/2024 11:18 AM     Please note: 24 hour notice for cancellation of appointment is required.    You may receive a survey in the mail, or via the e-mail address that you have provided.  We would appreciate if you could fill out the survey and provide us with any feedback on your experience regarding your visit today. Thank you for allowing us to provide you with your health care needs.     Do not hesitate to call if you are experiencing severe pain, worsening or change in your pain, have symptoms of infection (fever, warmth, redness, increased drainage), or have any other problem that concerns you ~ [phone] (or [phone] after hours).    Please remember when requesting refills on pain medication that the request should be made by Thursday at the latest. [facility] is open Monday-Friday, 8am-5pm, and closed on the weekends.  No narcotic refills will be filled after hours.    Additional Educational Resources:  For additional resources regarding your symptoms, diagnosis, or further health information, please visit the Health Resources section on [email or web address] or the Online Health Resources section in [employer].       
no

## 2024-07-29 NOTE — ED ADULT NURSE NOTE - WAS YOUR LAST COVID-19 VACCINE GREATER THAN OR EQUAL TO TWO MONTHS AGO?
Pt called and stated that he was told by [facility] ([facility]) that he needed to go to ER right away because of an infection in foot. Pt stated he wanted Dr Guillermo to admit him so he could avoid ER. Informed pt Dr Guillermo doesn't have hospital privileges. Pt stated he will call son in law and have him admitted.   
No

## 2025-05-02 ENCOUNTER — OUTPATIENT (OUTPATIENT)
Dept: OUTPATIENT SERVICES | Facility: HOSPITAL | Age: 35
LOS: 1 days | End: 2025-05-02
Payer: MEDICAID

## 2025-05-02 DIAGNOSIS — L89.90 PRESSURE ULCER OF UNSPECIFIED SITE, UNSPECIFIED STAGE: ICD-10-CM

## 2025-05-02 PROBLEM — E11.621 TYPE 2 DIABETES MELLITUS WITH FOOT ULCER: Chronic | Status: ACTIVE | Noted: 2023-02-21

## 2025-05-02 PROCEDURE — 99203 OFFICE O/P NEW LOW 30 MIN: CPT

## 2025-05-05 DIAGNOSIS — M86.171 OTHER ACUTE OSTEOMYELITIS, RIGHT ANKLE AND FOOT: ICD-10-CM

## 2025-05-05 DIAGNOSIS — E11.42 TYPE 2 DIABETES MELLITUS WITH DIABETIC POLYNEUROPATHY: ICD-10-CM

## 2025-05-05 DIAGNOSIS — Z79.4 LONG TERM (CURRENT) USE OF INSULIN: ICD-10-CM

## 2025-05-05 DIAGNOSIS — L97.513 NON-PRESSURE CHRONIC ULCER OF OTHER PART OF RIGHT FOOT WITH NECROSIS OF MUSCLE: ICD-10-CM

## 2025-05-05 DIAGNOSIS — Z89.411 ACQUIRED ABSENCE OF RIGHT GREAT TOE: ICD-10-CM

## 2025-05-05 DIAGNOSIS — E11.621 TYPE 2 DIABETES MELLITUS WITH FOOT ULCER: ICD-10-CM

## 2025-05-09 ENCOUNTER — OUTPATIENT (OUTPATIENT)
Dept: OUTPATIENT SERVICES | Facility: HOSPITAL | Age: 35
LOS: 1 days | End: 2025-05-09
Payer: MEDICAID

## 2025-05-09 DIAGNOSIS — E11.621 TYPE 2 DIABETES MELLITUS WITH FOOT ULCER: ICD-10-CM

## 2025-05-09 DIAGNOSIS — E11.42 TYPE 2 DIABETES MELLITUS WITH DIABETIC POLYNEUROPATHY: ICD-10-CM

## 2025-05-09 DIAGNOSIS — Z89.411 ACQUIRED ABSENCE OF RIGHT GREAT TOE: ICD-10-CM

## 2025-05-09 DIAGNOSIS — M86.171 OTHER ACUTE OSTEOMYELITIS, RIGHT ANKLE AND FOOT: ICD-10-CM

## 2025-05-09 DIAGNOSIS — L97.513 NON-PRESSURE CHRONIC ULCER OF OTHER PART OF RIGHT FOOT WITH NECROSIS OF MUSCLE: ICD-10-CM

## 2025-05-09 DIAGNOSIS — Z79.4 LONG TERM (CURRENT) USE OF INSULIN: ICD-10-CM

## 2025-05-09 DIAGNOSIS — L89.90 PRESSURE ULCER OF UNSPECIFIED SITE, UNSPECIFIED STAGE: ICD-10-CM

## 2025-05-09 LAB — PREALB SERPL-MCNC: 29 MG/DL — SIGNIFICANT CHANGE UP (ref 20–40)

## 2025-05-09 PROCEDURE — 99213 OFFICE O/P EST LOW 20 MIN: CPT

## 2025-05-16 ENCOUNTER — OUTPATIENT (OUTPATIENT)
Dept: OUTPATIENT SERVICES | Facility: HOSPITAL | Age: 35
LOS: 1 days | End: 2025-05-16

## 2025-05-16 DIAGNOSIS — L89.90 PRESSURE ULCER OF UNSPECIFIED SITE, UNSPECIFIED STAGE: ICD-10-CM

## 2025-05-23 ENCOUNTER — OUTPATIENT (OUTPATIENT)
Dept: OUTPATIENT SERVICES | Facility: HOSPITAL | Age: 35
LOS: 1 days | End: 2025-05-23

## 2025-05-23 DIAGNOSIS — L89.90 PRESSURE ULCER OF UNSPECIFIED SITE, UNSPECIFIED STAGE: ICD-10-CM

## 2025-05-29 DIAGNOSIS — E11.42 TYPE 2 DIABETES MELLITUS WITH DIABETIC POLYNEUROPATHY: ICD-10-CM

## 2025-05-29 DIAGNOSIS — L97.513 NON-PRESSURE CHRONIC ULCER OF OTHER PART OF RIGHT FOOT WITH NECROSIS OF MUSCLE: ICD-10-CM

## 2025-05-29 DIAGNOSIS — Z79.4 LONG TERM (CURRENT) USE OF INSULIN: ICD-10-CM

## 2025-05-29 DIAGNOSIS — E11.621 TYPE 2 DIABETES MELLITUS WITH FOOT ULCER: ICD-10-CM

## 2025-05-29 DIAGNOSIS — M86.171 OTHER ACUTE OSTEOMYELITIS, RIGHT ANKLE AND FOOT: ICD-10-CM

## 2025-05-29 DIAGNOSIS — Z89.411 ACQUIRED ABSENCE OF RIGHT GREAT TOE: ICD-10-CM

## 2025-05-30 DIAGNOSIS — L97.513 NON-PRESSURE CHRONIC ULCER OF OTHER PART OF RIGHT FOOT WITH NECROSIS OF MUSCLE: ICD-10-CM

## 2025-05-30 DIAGNOSIS — E11.42 TYPE 2 DIABETES MELLITUS WITH DIABETIC POLYNEUROPATHY: ICD-10-CM

## 2025-05-30 DIAGNOSIS — Z89.411 ACQUIRED ABSENCE OF RIGHT GREAT TOE: ICD-10-CM

## 2025-05-30 DIAGNOSIS — M86.171 OTHER ACUTE OSTEOMYELITIS, RIGHT ANKLE AND FOOT: ICD-10-CM

## 2025-05-30 DIAGNOSIS — Z79.4 LONG TERM (CURRENT) USE OF INSULIN: ICD-10-CM

## 2025-05-30 DIAGNOSIS — E11.621 TYPE 2 DIABETES MELLITUS WITH FOOT ULCER: ICD-10-CM

## 2025-06-06 ENCOUNTER — OUTPATIENT (OUTPATIENT)
Dept: OUTPATIENT SERVICES | Facility: HOSPITAL | Age: 35
LOS: 1 days | End: 2025-06-06

## 2025-06-06 DIAGNOSIS — L89.90 PRESSURE ULCER OF UNSPECIFIED SITE, UNSPECIFIED STAGE: ICD-10-CM

## 2025-06-09 DIAGNOSIS — L97.513 NON-PRESSURE CHRONIC ULCER OF OTHER PART OF RIGHT FOOT WITH NECROSIS OF MUSCLE: ICD-10-CM

## 2025-06-09 DIAGNOSIS — E11.69 TYPE 2 DIABETES MELLITUS WITH OTHER SPECIFIED COMPLICATION: ICD-10-CM

## 2025-06-09 DIAGNOSIS — M86.171 OTHER ACUTE OSTEOMYELITIS, RIGHT ANKLE AND FOOT: ICD-10-CM

## 2025-06-09 DIAGNOSIS — Z79.4 LONG TERM (CURRENT) USE OF INSULIN: ICD-10-CM

## 2025-06-09 DIAGNOSIS — E11.42 TYPE 2 DIABETES MELLITUS WITH DIABETIC POLYNEUROPATHY: ICD-10-CM

## 2025-06-09 DIAGNOSIS — E11.621 TYPE 2 DIABETES MELLITUS WITH FOOT ULCER: ICD-10-CM

## 2025-06-09 DIAGNOSIS — Z89.411 ACQUIRED ABSENCE OF RIGHT GREAT TOE: ICD-10-CM

## 2025-06-16 ENCOUNTER — OUTPATIENT (OUTPATIENT)
Dept: OUTPATIENT SERVICES | Facility: HOSPITAL | Age: 35
LOS: 1 days | End: 2025-06-16
Payer: COMMERCIAL

## 2025-06-16 DIAGNOSIS — L89.90 PRESSURE ULCER OF UNSPECIFIED SITE, UNSPECIFIED STAGE: ICD-10-CM

## 2025-06-16 PROCEDURE — 99183 HYPERBARIC OXYGEN THERAPY: CPT

## 2025-06-17 ENCOUNTER — OUTPATIENT (OUTPATIENT)
Dept: OUTPATIENT SERVICES | Facility: HOSPITAL | Age: 35
LOS: 1 days | End: 2025-06-17
Payer: COMMERCIAL

## 2025-06-17 DIAGNOSIS — L89.90 PRESSURE ULCER OF UNSPECIFIED SITE, UNSPECIFIED STAGE: ICD-10-CM

## 2025-06-17 PROCEDURE — 99183 HYPERBARIC OXYGEN THERAPY: CPT

## 2025-06-18 ENCOUNTER — OUTPATIENT (OUTPATIENT)
Dept: OUTPATIENT SERVICES | Facility: HOSPITAL | Age: 35
LOS: 1 days | End: 2025-06-18
Payer: COMMERCIAL

## 2025-06-18 DIAGNOSIS — L89.90 PRESSURE ULCER OF UNSPECIFIED SITE, UNSPECIFIED STAGE: ICD-10-CM

## 2025-06-18 PROCEDURE — 99183 HYPERBARIC OXYGEN THERAPY: CPT

## 2025-06-20 ENCOUNTER — OUTPATIENT (OUTPATIENT)
Dept: OUTPATIENT SERVICES | Facility: HOSPITAL | Age: 35
LOS: 1 days | End: 2025-06-20
Payer: COMMERCIAL

## 2025-06-20 DIAGNOSIS — L89.90 PRESSURE ULCER OF UNSPECIFIED SITE, UNSPECIFIED STAGE: ICD-10-CM

## 2025-06-20 PROCEDURE — 99183 HYPERBARIC OXYGEN THERAPY: CPT

## 2025-06-23 ENCOUNTER — OUTPATIENT (OUTPATIENT)
Dept: OUTPATIENT SERVICES | Facility: HOSPITAL | Age: 35
LOS: 1 days | End: 2025-06-23

## 2025-06-23 DIAGNOSIS — L97.513 NON-PRESSURE CHRONIC ULCER OF OTHER PART OF RIGHT FOOT WITH NECROSIS OF MUSCLE: ICD-10-CM

## 2025-06-23 DIAGNOSIS — E11.621 TYPE 2 DIABETES MELLITUS WITH FOOT ULCER: ICD-10-CM

## 2025-06-23 DIAGNOSIS — M86.171 OTHER ACUTE OSTEOMYELITIS, RIGHT ANKLE AND FOOT: ICD-10-CM

## 2025-06-23 DIAGNOSIS — L89.90 PRESSURE ULCER OF UNSPECIFIED SITE, UNSPECIFIED STAGE: ICD-10-CM

## 2025-06-24 ENCOUNTER — OUTPATIENT (OUTPATIENT)
Dept: OUTPATIENT SERVICES | Facility: HOSPITAL | Age: 35
LOS: 1 days | End: 2025-06-24
Payer: COMMERCIAL

## 2025-06-24 DIAGNOSIS — L89.90 PRESSURE ULCER OF UNSPECIFIED SITE, UNSPECIFIED STAGE: ICD-10-CM

## 2025-06-24 PROCEDURE — 99183 HYPERBARIC OXYGEN THERAPY: CPT

## 2025-06-26 ENCOUNTER — OUTPATIENT (OUTPATIENT)
Dept: OUTPATIENT SERVICES | Facility: HOSPITAL | Age: 35
LOS: 1 days | End: 2025-06-26
Payer: COMMERCIAL

## 2025-06-26 DIAGNOSIS — L89.90 PRESSURE ULCER OF UNSPECIFIED SITE, UNSPECIFIED STAGE: ICD-10-CM

## 2025-06-26 PROCEDURE — 99183 HYPERBARIC OXYGEN THERAPY: CPT

## 2025-06-27 ENCOUNTER — OUTPATIENT (OUTPATIENT)
Dept: OUTPATIENT SERVICES | Facility: HOSPITAL | Age: 35
LOS: 1 days | End: 2025-06-27

## 2025-06-27 DIAGNOSIS — L97.513 NON-PRESSURE CHRONIC ULCER OF OTHER PART OF RIGHT FOOT WITH NECROSIS OF MUSCLE: ICD-10-CM

## 2025-06-27 DIAGNOSIS — E11.621 TYPE 2 DIABETES MELLITUS WITH FOOT ULCER: ICD-10-CM

## 2025-06-27 DIAGNOSIS — M86.171 OTHER ACUTE OSTEOMYELITIS, RIGHT ANKLE AND FOOT: ICD-10-CM

## 2025-06-27 DIAGNOSIS — L89.90 PRESSURE ULCER OF UNSPECIFIED SITE, UNSPECIFIED STAGE: ICD-10-CM

## 2025-06-30 ENCOUNTER — OUTPATIENT (OUTPATIENT)
Dept: OUTPATIENT SERVICES | Facility: HOSPITAL | Age: 35
LOS: 1 days | End: 2025-06-30
Payer: COMMERCIAL

## 2025-06-30 DIAGNOSIS — L89.90 PRESSURE ULCER OF UNSPECIFIED SITE, UNSPECIFIED STAGE: ICD-10-CM

## 2025-06-30 PROCEDURE — 99183 HYPERBARIC OXYGEN THERAPY: CPT

## 2025-07-07 DIAGNOSIS — L97.513 NON-PRESSURE CHRONIC ULCER OF OTHER PART OF RIGHT FOOT WITH NECROSIS OF MUSCLE: ICD-10-CM

## 2025-07-07 DIAGNOSIS — E11.621 TYPE 2 DIABETES MELLITUS WITH FOOT ULCER: ICD-10-CM

## 2025-07-07 DIAGNOSIS — M86.171 OTHER ACUTE OSTEOMYELITIS, RIGHT ANKLE AND FOOT: ICD-10-CM

## 2025-07-07 DIAGNOSIS — E11.42 TYPE 2 DIABETES MELLITUS WITH DIABETIC POLYNEUROPATHY: ICD-10-CM

## 2025-08-15 ENCOUNTER — OUTPATIENT (OUTPATIENT)
Dept: OUTPATIENT SERVICES | Facility: HOSPITAL | Age: 35
LOS: 1 days | End: 2025-08-15

## 2025-08-15 DIAGNOSIS — L89.90 PRESSURE ULCER OF UNSPECIFIED SITE, UNSPECIFIED STAGE: ICD-10-CM

## 2025-08-25 DIAGNOSIS — Z79.4 LONG TERM (CURRENT) USE OF INSULIN: ICD-10-CM

## 2025-08-25 DIAGNOSIS — Z89.411 ACQUIRED ABSENCE OF RIGHT GREAT TOE: ICD-10-CM

## 2025-08-25 DIAGNOSIS — M86.171 OTHER ACUTE OSTEOMYELITIS, RIGHT ANKLE AND FOOT: ICD-10-CM

## 2025-08-25 DIAGNOSIS — E11.42 TYPE 2 DIABETES MELLITUS WITH DIABETIC POLYNEUROPATHY: ICD-10-CM

## 2025-08-25 DIAGNOSIS — E11.621 TYPE 2 DIABETES MELLITUS WITH FOOT ULCER: ICD-10-CM

## 2025-08-25 DIAGNOSIS — L97.513 NON-PRESSURE CHRONIC ULCER OF OTHER PART OF RIGHT FOOT WITH NECROSIS OF MUSCLE: ICD-10-CM

## 2025-08-25 DIAGNOSIS — E11.69 TYPE 2 DIABETES MELLITUS WITH OTHER SPECIFIED COMPLICATION: ICD-10-CM
